# Patient Record
Sex: MALE | Race: WHITE | NOT HISPANIC OR LATINO | Employment: UNEMPLOYED | URBAN - METROPOLITAN AREA
[De-identification: names, ages, dates, MRNs, and addresses within clinical notes are randomized per-mention and may not be internally consistent; named-entity substitution may affect disease eponyms.]

---

## 2023-01-24 PROBLEM — F32.A ANXIETY AND DEPRESSION: Status: ACTIVE | Noted: 2023-01-24

## 2023-01-24 PROBLEM — F41.9 ANXIETY AND DEPRESSION: Status: ACTIVE | Noted: 2023-01-24

## 2023-01-24 PROBLEM — H93.12 TINNITUS OF LEFT EAR: Status: ACTIVE | Noted: 2023-01-24

## 2023-01-24 PROBLEM — M19.072 ARTHROPATHY OF LEFT ANKLE: Status: ACTIVE | Noted: 2023-01-24

## 2023-01-24 PROBLEM — E10.9 CONTROLLED TYPE 1 DIABETES MELLITUS (MULTI): Status: ACTIVE | Noted: 2023-01-24

## 2023-01-24 PROBLEM — R10.13 CHRONIC EPIGASTRIC PAIN: Status: ACTIVE | Noted: 2023-01-24

## 2023-01-24 PROBLEM — Z96.41 INSULIN PUMP IN PLACE: Status: ACTIVE | Noted: 2023-01-24

## 2023-01-24 PROBLEM — G89.29 CHRONIC EPIGASTRIC PAIN: Status: ACTIVE | Noted: 2023-01-24

## 2023-01-24 PROBLEM — H90.42 SENSORINEURAL HEARING LOSS (SNHL) OF LEFT EAR WITH UNRESTRICTED HEARING OF RIGHT EAR: Status: ACTIVE | Noted: 2023-01-24

## 2023-01-24 PROBLEM — R03.0 ELEVATED BLOOD PRESSURE READING: Status: ACTIVE | Noted: 2023-01-24

## 2023-01-24 RX ORDER — URINE ACETONE TEST STRIPS
STRIP MISCELLANEOUS
COMMUNITY
Start: 2014-09-08

## 2023-01-24 RX ORDER — EMTRICITABINE AND TENOFOVIR ALAFENAMIDE 200; 25 MG/1; MG/1
1 TABLET ORAL DAILY
COMMUNITY
Start: 2022-10-18 | End: 2024-02-22

## 2023-01-24 RX ORDER — GLUCAGON 3 MG/1
3 POWDER NASAL
COMMUNITY
Start: 2021-05-24 | End: 2024-02-12 | Stop reason: SDUPTHER

## 2023-01-24 RX ORDER — IBUPROFEN 200 MG
TABLET ORAL
COMMUNITY
Start: 2018-01-31

## 2023-01-24 RX ORDER — INSULIN LISPRO-AABC 100 [IU]/ML
INJECTION, SOLUTION INTRAVENOUS; SUBCUTANEOUS
COMMUNITY
Start: 2022-02-09

## 2023-03-31 ENCOUNTER — TELEMEDICINE (OUTPATIENT)
Dept: PRIMARY CARE | Facility: CLINIC | Age: 28
End: 2023-03-31
Payer: COMMERCIAL

## 2023-03-31 DIAGNOSIS — Z20.2 EXPOSURE TO GONORRHEA: Primary | ICD-10-CM

## 2023-03-31 DIAGNOSIS — Z11.3 SCREENING FOR GONORRHEA: ICD-10-CM

## 2023-03-31 DIAGNOSIS — Z11.3 ROUTINE SCREENING FOR STI (SEXUALLY TRANSMITTED INFECTION): ICD-10-CM

## 2023-03-31 DIAGNOSIS — Z11.8 SCREENING FOR CHLAMYDIAL DISEASE: ICD-10-CM

## 2023-03-31 PROCEDURE — 99212 OFFICE O/P EST SF 10 MIN: CPT | Performed by: FAMILY MEDICINE

## 2023-03-31 NOTE — PROGRESS NOTES
Subjective   Patient ID: Talib Bell is a 27 y.o. male who presents for 3 month Descovy follow up (Asymptomatic, did recently stop taking).    HPI   He is here today for his 3-month follow-up.  He had been taking Descovy daily for HIV preexposure prophylaxis, but stopped taking this medication approximately 1.5 months ago.  He is now in a relationship and does not feel that he needs to continue to take this medication for prevention.  We did speak on the phone approximately 6 weeks ago and had treated him with doxycycline due to possible chlamydia exposure.  He has not had any symptoms including urethral discharge.         Review of Systems    Objective   There were no vitals taken for this visit.    Physical Exam    Assessment/Plan   Problem List Items Addressed This Visit    None  Visit Diagnoses       Exposure to gonorrhea    -  Primary    Screening for gonorrhea        Relevant Orders    C. Trachomatis / N. Gonorrhoeae, Amplified Detection    Routine screening for STI (sexually transmitted infection)        Relevant Orders    C. Trachomatis / N. Gonorrhoeae, Amplified Detection    Screening for chlamydial disease        Relevant Orders    C. Trachomatis / N. Gonorrhoeae, Amplified Detection        HIV preexposure prophylaxis:  He is no longer taking Descovy since he is currently in a relationship and does not feel that he needs to restart at this time.  Recommended scheduling a follow-up appointment if he does decide that he would like to restart this medication.  He is interested in being rescreened for gonorrhea and chlamydia due to possible exposure last month.  We will obtain urine testing for gonorrhea and chlamydia.  Otherwise, recommended follow-up in October when due for next annual physical

## 2023-08-08 LAB — HEMOGLOBIN A1C/HEMOGLOBIN TOTAL IN BLOOD EXTERNAL: 7.7 %

## 2023-10-10 ENCOUNTER — APPOINTMENT (OUTPATIENT)
Dept: ENDOCRINOLOGY | Facility: CLINIC | Age: 28
End: 2023-10-10
Payer: COMMERCIAL

## 2023-11-30 ENCOUNTER — TELEPHONE (OUTPATIENT)
Dept: ENDOCRINOLOGY | Facility: CLINIC | Age: 28
End: 2023-11-30
Payer: COMMERCIAL

## 2023-11-30 DIAGNOSIS — E10.9 TYPE 1 DIABETES MELLITUS WITHOUT COMPLICATION (MULTI): ICD-10-CM

## 2023-12-01 ENCOUNTER — LAB (OUTPATIENT)
Dept: LAB | Facility: LAB | Age: 28
End: 2023-12-01
Payer: COMMERCIAL

## 2023-12-01 DIAGNOSIS — E10.9 TYPE 1 DIABETES MELLITUS WITHOUT COMPLICATION (MULTI): ICD-10-CM

## 2023-12-01 LAB
EST. AVERAGE GLUCOSE BLD GHB EST-MCNC: 140 MG/DL
HBA1C MFR BLD: 6.5 %

## 2023-12-01 PROCEDURE — 83036 HEMOGLOBIN GLYCOSYLATED A1C: CPT

## 2024-02-05 DIAGNOSIS — E10.9 TYPE 1 DIABETES MELLITUS WITHOUT COMPLICATION (MULTI): Primary | ICD-10-CM

## 2024-02-06 DIAGNOSIS — E10.9 TYPE 1 DIABETES MELLITUS WITHOUT COMPLICATION (MULTI): ICD-10-CM

## 2024-02-06 NOTE — TELEPHONE ENCOUNTER
Patient asked for script for Dexcom Transmitter to be sent to [facility], New script pended to provider. Shaista Mcleod LPN

## 2024-02-12 ENCOUNTER — TELEPHONE (OUTPATIENT)
Dept: ENDOCRINOLOGY | Facility: CLINIC | Age: 29
End: 2024-02-12

## 2024-02-12 ENCOUNTER — TELEMEDICINE CLINICAL SUPPORT (OUTPATIENT)
Dept: ENDOCRINOLOGY | Facility: CLINIC | Age: 29
End: 2024-02-12
Payer: COMMERCIAL

## 2024-02-12 DIAGNOSIS — E55.9 VITAMIN D DEFICIENCY: ICD-10-CM

## 2024-02-12 DIAGNOSIS — E10.9 TYPE 1 DIABETES MELLITUS WITHOUT COMPLICATION (MULTI): Primary | ICD-10-CM

## 2024-02-12 DIAGNOSIS — E10.9 CONTROLLED DIABETES MELLITUS TYPE 1 WITHOUT COMPLICATIONS (MULTI): Primary | ICD-10-CM

## 2024-02-12 PROCEDURE — G0108 DIAB MANAGE TRN  PER INDIV: HCPCS | Performed by: STUDENT IN AN ORGANIZED HEALTH CARE EDUCATION/TRAINING PROGRAM

## 2024-02-12 RX ORDER — BLOOD-GLUCOSE SENSOR
EACH MISCELLANEOUS
Qty: 9 EACH | Refills: 3 | Status: SHIPPED | OUTPATIENT
Start: 2024-02-12

## 2024-02-12 RX ORDER — GLUCAGON 3 MG/1
1 POWDER NASAL AS NEEDED
Qty: 2 EACH | Refills: 3 | Status: SHIPPED | OUTPATIENT
Start: 2024-02-12

## 2024-02-12 NOTE — TELEPHONE ENCOUNTER
Additional information concerning Dexcom transmitter for Talib Bell   Faxed to [facility] at [phone] as requested . Shaista Mcleod LPN

## 2024-02-12 NOTE — PROGRESS NOTES
Reason for Visit:  Talib Bell is a 28 y.o. male who presents for Follow-up DSME Visit    DSME - Global Assessment    Referring Provider: Dr. Caputo  Marital Status: single.  Support Person:  na .    Have you had diabetes education in the past?  Yes. When: 4 months ago .  What Concerns you most about having diabetes: Managing glycemia and currently would like to add more carbs to intake, but also loose a few pounds.    Readiness to Learn: demonstrates willingness to learn and demonstrates ability to learn  Preferred learning method: observing, reading and writing, listening, and doing    Household Composition: living independently, alone    Demographics:   Difficulties with: None  Highest Level of Education: College Graduate  Race/Ethnic Origin: White/  Occupation:  Researcher medical devices  Work hours: full-time day.    Health Status:  Smoking/Tobacco Use: No, patient does not smoke or use tobacco.  Alcohol Use: Yes, patient drinks alcohol. Pt historically socially drinks, but has stopped since the new year.     Type of Diabetes: Type 1    Patient Active Problem List    Diagnosis Date Noted    Anxiety and depression 01/24/2023    Arthropathy of left ankle 01/24/2023    Chronic epigastric pain 01/24/2023    Controlled type 1 diabetes mellitus (CMS/HCC) 01/24/2023    Elevated blood pressure reading 01/24/2023    Insulin pump in place 01/24/2023    Sensorineural hearing loss (SNHL) of left ear with unrestricted hearing of right ear 01/24/2023    Tinnitus of left ear 01/24/2023         Lab Results   Component Value Date    HGBA1C 6.5 (H) 12/01/2023    HGBA1C 7.7 03/15/2023    HGBA1C 7.4 05/28/2020       Diabetes Self-Management Skills and Behaviors:   Do you exercise regularly?: Yes. 3-4 times/week.  Physical Activity : walking, running, weight training, and calisthenics  Yes  How do you manage your diabetes when you are sick?: call doctor, drink more fluids, and test blood sugar more often      Current  Outpatient Medications   Medication Sig Dispense Refill    acetone, urine, test (Ketostix) strip USE AS DIRECTED.      blood-glucose transmitter device device Inject 1 each under the skin continuously. Use as instructed DEXCOM G6 90 each 3    emtricitabine-tenofovir alafen (Descovy) 200-25 mg tablet Take 1 tablet by mouth once daily.      glucagon (Baqsimi) 3 mg/actuation spray,non-aerosol Administer 3 mg into affected nostril(s).      glucagon 1 mg injection 1 mg.      glucose 4 gram chewable tablet Chew.      insulin lispro-aabc (Lyumjev U-100 Insulin) 100 unit/mL solution Use in pump as directed.  units.       No current facility-administered medications for this visit.     T:slim x2 with control iQ. Reviewed option to program sleep mode as an option to help maintain glycemia. Pt was having some lower glucose readings early morning from autocorrections and appears he would benefit from reducing his 12am-5am ISF from 25 to 30.       Injection/Infusion Sites: arm(s) and buttock(s). Appropriate disposal of sharps. Appropriate storage of insulin.    Monitorng: CGM: Dexcom G6 synced with pump          Acute Complications-Safety: carries glucose    Hypoglycemia: Frequency: few times a week.  Hypoglycemia Treatment: auto titration with control iq, simple sugars- rule 15.    Hyperglycemia: polyuria and tiredness  Hyperglycemia Treatment: water, activity, insulin correction, check pump set.    DSME - Meal Planning and Diet Recall  Are you currently following any meal plan: Carbohydrate Counting, Low Carbohydrates, Low Fat, and lean protein  .    Pt would like to lose weight, but also wants to add more carb intake to possible help with energy throughout the day. Pt had hx of vit. D deficiency and will pend labs for consideration (including TSH). Pended dietitian consult for weight management and reviewed balance of marcro nutrients     How many meals do you eat in per day: three.  Which meals do you tend to skip:  none  What do you drink with and between meals: water    Topics Covered and Impression:    DSME Topics Covered During Visit:   Glucagon Teaching  Reviewed Hyperglycemia Signs/Symptoms/Tx Plan  Glycemic Pattern Management  Healthy Meal Plan  Reviewed adding some carb intake ~ 80g total per day- follow up with dietitian consult. Pt also encouraged to upgrade tandem pump software and work towards switching to G7 sensor. Pended orders to provider along with glucagon reorder.      Time: I personally spent a total of 60 minutes with the patient providing diabetes self-management education. Visit documentation will be sent electronically to referring provider.       Chin Davis, RONN, RN, [facility]

## 2024-02-16 ENCOUNTER — TELEPHONE (OUTPATIENT)
Dept: ENDOCRINOLOGY | Facility: CLINIC | Age: 29
End: 2024-02-16
Payer: COMMERCIAL

## 2024-02-20 ENCOUNTER — TELEPHONE (OUTPATIENT)
Dept: PRIMARY CARE | Facility: CLINIC | Age: 29
End: 2024-02-20
Payer: COMMERCIAL

## 2024-02-22 ENCOUNTER — LAB (OUTPATIENT)
Dept: LAB | Facility: LAB | Age: 29
End: 2024-02-22
Payer: COMMERCIAL

## 2024-02-22 ENCOUNTER — OFFICE VISIT (OUTPATIENT)
Dept: PRIMARY CARE | Facility: CLINIC | Age: 29
End: 2024-02-22
Payer: COMMERCIAL

## 2024-02-22 VITALS
HEART RATE: 71 BPM | RESPIRATION RATE: 16 BRPM | OXYGEN SATURATION: 98 % | SYSTOLIC BLOOD PRESSURE: 124 MMHG | DIASTOLIC BLOOD PRESSURE: 76 MMHG

## 2024-02-22 DIAGNOSIS — B35.4 TINEA CORPORIS: Primary | ICD-10-CM

## 2024-02-22 DIAGNOSIS — E55.9 VITAMIN D DEFICIENCY: ICD-10-CM

## 2024-02-22 DIAGNOSIS — E10.9 TYPE 1 DIABETES MELLITUS WITHOUT COMPLICATION (MULTI): ICD-10-CM

## 2024-02-22 PROBLEM — R10.13 CHRONIC EPIGASTRIC PAIN: Status: RESOLVED | Noted: 2023-01-24 | Resolved: 2024-02-22

## 2024-02-22 PROBLEM — G89.29 CHRONIC EPIGASTRIC PAIN: Status: RESOLVED | Noted: 2023-01-24 | Resolved: 2024-02-22

## 2024-02-22 PROBLEM — R03.0 ELEVATED BLOOD PRESSURE READING: Status: RESOLVED | Noted: 2023-01-24 | Resolved: 2024-02-22

## 2024-02-22 PROBLEM — M19.072 ARTHROPATHY OF LEFT ANKLE: Status: RESOLVED | Noted: 2023-01-24 | Resolved: 2024-02-22

## 2024-02-22 LAB
25(OH)D3 SERPL-MCNC: 26 NG/ML (ref 30–100)
TSH SERPL-ACNC: 2.22 MIU/L (ref 0.44–3.98)

## 2024-02-22 PROCEDURE — 84443 ASSAY THYROID STIM HORMONE: CPT

## 2024-02-22 PROCEDURE — 99213 OFFICE O/P EST LOW 20 MIN: CPT | Performed by: FAMILY MEDICINE

## 2024-02-22 PROCEDURE — 3078F DIAST BP <80 MM HG: CPT | Performed by: FAMILY MEDICINE

## 2024-02-22 PROCEDURE — 3074F SYST BP LT 130 MM HG: CPT | Performed by: FAMILY MEDICINE

## 2024-02-22 PROCEDURE — 1036F TOBACCO NON-USER: CPT | Performed by: FAMILY MEDICINE

## 2024-02-22 PROCEDURE — 36415 COLL VENOUS BLD VENIPUNCTURE: CPT

## 2024-02-22 PROCEDURE — 82306 VITAMIN D 25 HYDROXY: CPT

## 2024-02-22 RX ORDER — KETOCONAZOLE 20 MG/G
CREAM TOPICAL 2 TIMES DAILY
Qty: 30 G | Refills: 0 | Status: SHIPPED | OUTPATIENT
Start: 2024-02-22 | End: 2024-03-07

## 2024-02-22 RX ORDER — BUPROPION HYDROCHLORIDE 300 MG/1
300 TABLET ORAL DAILY
COMMUNITY

## 2024-02-22 NOTE — PROGRESS NOTES
Subjective   Patient ID: Talib Bell is a 28 y.o. male who presents for Rash.    Rash  This is a new problem. The current episode started 1 to 4 weeks ago. Location: Left Popliteal.   Here today to discuss rash.  He has had a rash on his left posterior knee for approximately 1 month.  Nothing to treat.  No recent exposure to any new potential allergens.  No pain or itch        Review of Systems   Skin:  Positive for rash.       Objective   /76   Pulse 71   Resp 16   SpO2 98%     Physical Exam  Constitutional:       General: He is not in acute distress.     Appearance: Normal appearance. He is well-developed.   Pulmonary:      Effort: Pulmonary effort is normal.   Skin:     Findings: Rash present.      Comments: There is a slightly scaly, oval-shaped, dull erythematous patch located left posterior knee and popliteal space, measuring 3 to 4 cm in diameter   Neurological:      Mental Status: He is alert.   Psychiatric:         Mood and Affect: Mood normal.         Behavior: Behavior normal.         Assessment/Plan   Problem List Items Addressed This Visit    None  Visit Diagnoses       Tinea corporis    -  Primary    Relevant Medications    ketoconazole (NIZOral) 2 % cream        Rash is most consistent with tinea corporis.  We will treat with ketoconazole 2% cream twice daily for up to 14 days.  Recommended that he call or follow-up in 2 weeks if not resolved

## 2024-03-07 ENCOUNTER — TELEMEDICINE CLINICAL SUPPORT (OUTPATIENT)
Dept: NUTRITION | Facility: CLINIC | Age: 29
End: 2024-03-07
Payer: COMMERCIAL

## 2024-03-07 VITALS — HEIGHT: 72 IN | BODY MASS INDEX: 30.11 KG/M2

## 2024-03-07 DIAGNOSIS — Z71.3 DIETARY COUNSELING AND SURVEILLANCE: Primary | ICD-10-CM

## 2024-03-07 DIAGNOSIS — E10.9 TYPE 1 DIABETES MELLITUS WITHOUT COMPLICATION (MULTI): ICD-10-CM

## 2024-03-07 PROCEDURE — 97802 MEDICAL NUTRITION INDIV IN: CPT | Mod: 95

## 2024-03-07 PROCEDURE — 97802 MEDICAL NUTRITION INDIV IN: CPT

## 2024-03-07 NOTE — PROGRESS NOTES
Nutrition: Initial Assessment    Reason for Nutrition Visit: Patient is a 28 y.o. male referred for T1DM. Referred on 2/12/24 by Dr. Tiara Caputo.     Nutrition Assessment    Food and Nutrient History: Pt presents virtually for nutrition counseling. Follows a high protein diet with very minimally processed foods. Goes to gym around 2-3pm. Uses [employer] and feels like he may not be getting enough kcals.    Dietary Patterns:  Based on [employer] data, currently eating 2000 kcals at most  Averaging about 150 g cho   Protein anywhere b/w 110-150 g     Dietary Recall:  Meal 1: usually skips  Meal 2: 10:30-11:30 AM  Meal 3: 5-6:30 PM  Snacks: snack in the afternoon // protein shake ([employer]) 2-3x per week after gym    Energy Intake: Good > 75 %  Food Allergy: none  Food Intolerance: none  Cooking: Patient  Grocery Shopping: Patient  Dietary Supplements: none  Food Insecurity: Denies    Physical Activity:   Goes to gym 1-1.5 hr, 4 days per week, weight lifting  Walking on treadmill -- gets 10,000 steps daily     Labs:  Lab Results   Component Value Date    HGBA1C 6.5 (H) 12/01/2023    HGBA1C 7.7 03/15/2023    HGBA1C 7.4 05/28/2020     08/29/2022    K 4.1 08/29/2022     08/29/2022    CO2 30 08/29/2022    BUN 10 08/29/2022    CREATININE 0.90 08/29/2022    CALCIUM 9.1 08/29/2022    ALBUMIN 4.2 08/29/2022    PROT 6.9 08/29/2022    BILITOT 0.6 08/29/2022    ALKPHOS 77 08/29/2022    ALT 18 08/29/2022    AST 23 08/29/2022    GLUCOSE 183 (H) 08/29/2022    CHOL 148 05/24/2021    TRIG 77 05/24/2021    HDL 60.0 05/24/2021   Comments: Low vitamin D = 26 (2/22/24). A1c at goal (12/1/23). Last lipid panel = normal (5/24/21).       Diabetes:  Diagnosed at 9 or 10 y.o.   Prior Nutrition Education: Yes  Current DM Medications/Insulin Regimen: Tandem t slim     -----------------------------  Dexcom Clarity  -----------------------------  Talib Bell  YOB: 1995  Generated at: Thu, Mar 7, 2024 11:53 AM  EST  Reporting period: Fri Feb 23, 2024 - Thu Mar 7, 2024  -----------------------------  Glucose Details  Average glucose: 155 mg/dL  Standard deviation: 52 mg/dL  GMI: 7.0%  -----------------------------  Time in Range  Very High: 5%  High: 19%  In Range: 75%  Low: <1%  Very Low: 0%    Target Range   mg/dL    -----------------------------  CGM Details  Sensor usage: 93%  Days with CGM data: 13/14      Nutrition Focused Physical Exam:  Performed/Deferred: Deferred due to be being virtual visit      Past Medical History:  Patient Active Problem List   Diagnosis    Anxiety and depression    Controlled type 1 diabetes mellitus (CMS/HCC)    Insulin pump in place    Sensorineural hearing loss (SNHL) of left ear with unrestricted hearing of right ear    Tinnitus of left ear        Anthropometrics:  Ht Readings from Last 1 Encounters:   03/07/24 1.829 m (6')     BMI Readings from Last 1 Encounters:   03/07/24 30.11 kg/m²     Wt Readings from Last 10 Encounters:   03/15/23 101 kg (222 lb)   10/18/22 101 kg (223 lb)   08/24/22 96.2 kg (212 lb)   02/02/22 103 kg (227 lb)   10/15/21 103 kg (227 lb)   07/12/21 104 kg (230 lb)   06/09/21 104 kg (230 lb)   05/24/21 107 kg (236 lb 8.9 oz)   10/14/20 99.3 kg (219 lb)   09/14/20 102 kg (224 lb)     Current wt = 225 lbs. (3/7/24)       Estimated Energy Needs:    Total Energy Estimated Needs (kCal): 2350 kCal   Method for Estimating Needs: MSJ 2039 x 1.4 - 500 (for weight loss)   Total Protein Estimated Needs (g): 130 g   Total Protein Estimated Needs (g/kg): 1.3 g/kg    Nutrition Diagnosis     Patient has Nutrition Diagnosis: Yes Diagnosis Status (1): New  Nutrition Diagnosis 1: Inadequate vitamin intake Related to (1): low dietary intake vitamin D As Evidenced by (1): vitamin D = 26 (2/22/24)     Diagnosis Status (2): New Nutrition Diagnosis 2: Food and nutrition related knowledge deficit  Nutrition Diagnosis 2: Food and nutrition related knowledge deficit Related to (2):  limited prior exposure to nutrition education for a balanced diet As Evidenced by (2): patient's desire to learn                    Nutrition Interventions/Recommendations   Individualized Nutrition Prescription Provided for : 2000 kcals, 130 grams protein, 180 grams CHO per day   Meals & Snacks: Other, specify:  Goals: Carb counting    Nutrition Education  Discussed caloric intake and macronutrient breakdowns; provided suggestions and made adjustments to current dietary patterns based on patient's fitness goals. Breakdown works out to 26% protein, 36% cho, 38% fat. Patient will track in [employer]. We can adjust from there.   Answered patient's general nutrition questions.     *Patient expressed understanding of the nutrition education and denied any additional questions/concerns.       Nutrition Monitoring and Evaluation   Intentional wt loss of 0.5-1 lb per week trending toward patient's goal weight of 210-215 lbs.   Vitamin D WNL     Readiness to Change : Excellent  Level of Understanding : Excellent  Anticipated Compliant : Excellent     Follow-up: 4-6 weeks

## 2024-03-20 ENCOUNTER — CLINICAL SUPPORT (OUTPATIENT)
Dept: AUDIOLOGY | Facility: CLINIC | Age: 29
End: 2024-03-20
Payer: COMMERCIAL

## 2024-03-20 ENCOUNTER — OFFICE VISIT (OUTPATIENT)
Dept: OTOLARYNGOLOGY | Facility: CLINIC | Age: 29
End: 2024-03-20
Payer: COMMERCIAL

## 2024-03-20 DIAGNOSIS — H93.12 TINNITUS OF LEFT EAR: ICD-10-CM

## 2024-03-20 DIAGNOSIS — H90.42 SENSORINEURAL HEARING LOSS (SNHL) OF LEFT EAR WITH UNRESTRICTED HEARING OF RIGHT EAR: Primary | ICD-10-CM

## 2024-03-20 PROCEDURE — 1036F TOBACCO NON-USER: CPT | Performed by: OTOLARYNGOLOGY

## 2024-03-20 PROCEDURE — 92567 TYMPANOMETRY: CPT | Performed by: AUDIOLOGIST

## 2024-03-20 PROCEDURE — 92557 COMPREHENSIVE HEARING TEST: CPT | Performed by: AUDIOLOGIST

## 2024-03-20 PROCEDURE — 99213 OFFICE O/P EST LOW 20 MIN: CPT | Performed by: OTOLARYNGOLOGY

## 2024-03-20 NOTE — PROGRESS NOTES
Talib, age 28, was seen today for his annual hearing evaluation during his follow up with ENT, Dr. Levin.  He has a history of left hearing loss with tinnitus.  He denies concern for hearing change but does note that his tinnitus seems to be less noticeable.      Results:  Otoscopy revealed clear ear canals and tympanic membranes were visualized bilaterally.  Tympanometry revealed normal, Type A tympanograms, indicating normal ear canal volume, peak pressure and compliance bilaterally.   Audiometric thresholds revealed normal hearing sensitivity in his right ear and a normal hearing sensitivity [id number] Hz sloping to a severe sensorineural hearing loss in his left ear.  Word recognition scores were excellent bilaterally.  Hearing levels have remained stable as compared to his last evaluation March 2023.    Recommendations:  Follow-up with PCP, Dr. Dunn, as medically directed.  Follow-up with ENT, Dr. Levin, as medically directed.  Consider amplification for left ear.  Retest hearing annually to monitor.

## 2024-03-20 NOTE — PROGRESS NOTES
Talib Bell is a 28 y.o. year old male patient with YEARLY FU ON HEARING     Patient office today for 1 year follow-up on left ear hearing loss.  Patient with history of left ear hearing loss in the high frequencies with associated tinnitus.  Patient denies any hearing changes but reports that tinnitus may have lessened as it does not appear to be as noticeable.  All other ENT issues are negative.            Physical Exam:   General appearance: No acute distress. Normal facies. Symmetric facial movement. No gross lesions of the face are noted.  The external ear structures appear normal. The ear canals patent and the tympanic membranes are intact without evidence of air-fluid levels, retraction, or congenital defects.  Anterior rhinoscopy notes essentially a midline nasal septum. Examination is noted for normal healthy mucosal membranes without any evidence of lesions, polyps, or exudate. The tongue is normally mobile. There are no lesions on the gingiva, buccal, or oral mucosa. There are no oral cavity masses.  The neck is negative for mass lymphadenopathy. The trachea and parotid are clear. The thyroid bed is grossly unremarkable. The salivary gland structures are grossly unremarkable.  Audiogram:  Audiogram demonstrates essentially normal hearing right ear with no hearing loss of the right ear with a mild to severe high-frequency loss.  Assessment/Plan     1.  Sensorineural hearing loss left ear  Patient seen in the office today for follow-up on ears.  Patient stable hearing when compared to March of last year.  I favor observation and recommend follow-up in 1 year.    All questions were answered in this regard accordingly.

## 2024-04-11 ENCOUNTER — OFFICE VISIT (OUTPATIENT)
Dept: PRIMARY CARE | Facility: CLINIC | Age: 29
End: 2024-04-11
Payer: COMMERCIAL

## 2024-04-11 VITALS
SYSTOLIC BLOOD PRESSURE: 122 MMHG | HEART RATE: 98 BPM | OXYGEN SATURATION: 97 % | WEIGHT: 221 LBS | BODY MASS INDEX: 29.97 KG/M2 | DIASTOLIC BLOOD PRESSURE: 78 MMHG

## 2024-04-11 DIAGNOSIS — L30.9 DERMATITIS: Primary | ICD-10-CM

## 2024-04-11 PROCEDURE — 99213 OFFICE O/P EST LOW 20 MIN: CPT | Performed by: FAMILY MEDICINE

## 2024-04-11 PROCEDURE — 3074F SYST BP LT 130 MM HG: CPT | Performed by: FAMILY MEDICINE

## 2024-04-11 PROCEDURE — 1036F TOBACCO NON-USER: CPT | Performed by: FAMILY MEDICINE

## 2024-04-11 PROCEDURE — 3078F DIAST BP <80 MM HG: CPT | Performed by: FAMILY MEDICINE

## 2024-04-11 RX ORDER — TRIAMCINOLONE ACETONIDE 1 MG/G
1 CREAM TOPICAL 2 TIMES DAILY PRN
Qty: 30 G | Refills: 0 | Status: SHIPPED | OUTPATIENT
Start: 2024-04-11 | End: 2024-04-25

## 2024-04-11 NOTE — PROGRESS NOTES
Subjective   Patient ID: Talib Bell is a 28 y.o. male who presents for Rash.    Rash  This is a recurrent problem. The problem has been gradually worsening since onset. The affected locations include the neck (back of legs). The rash is characterized by scaling and dryness. Treatments tried: rx ketoconazole cream.   He is here today to follow-up on rash  He was seen 6 weeks ago with an annular rash involving his left posterior knee which had been present for 1 month.  At that time it was felt that this was due to tinea corporis.  We treated him with ketoconazole cream  He applied the cream and did not notice any improvement.  He does mention that the box the cream came in was damaged and he is not sure if this had affected the efficacy of the cream.  The rash seems to have gotten slightly bigger, and now 1 week ago he noticed a similar, smaller rash on his right neck.  The rash is dry.  No itch or pain.  No history of similar symptoms in past      Review of Systems   Skin:  Positive for rash.       Objective   /78   Pulse 98   Wt 100 kg (221 lb)   SpO2 97%   BMI 29.97 kg/m²     Physical Exam  Constitutional:       General: He is not in acute distress.     Appearance: Normal appearance. He is well-developed.   Pulmonary:      Effort: Pulmonary effort is normal.   Skin:     Findings: Rash present.      Comments: There is a dull erythematous, dry, scaly annular plaque located posterior aspect of left knee measuring approximately 5 cm in diameter.  There is a similar, smaller lesion located anterior-lateral aspect of right lower neck measuring approximately 1.5 cm in diameter   Neurological:      Mental Status: He is alert.   Psychiatric:         Mood and Affect: Mood normal.         Behavior: Behavior normal.         Assessment/Plan   Problem List Items Addressed This Visit    None  Visit Diagnoses       Dermatitis    -  Primary    Relevant Medications    triamcinolone (Kenalog) 0.1 % cream        The rash  still appears consistent with tinea corporis, however this did not improve with ketoconazole cream.  Differential diagnosis would also include possible eczema.  We will treat with topical triamcinolone cream for up to 2 weeks.  We discussed that he could also apply over-the-counter topical clotrimazole cream in case this is still a fungal infection.  If rash has not resolved in the next 2 to 4 weeks recommended that he call and we will plan on referring to dermatology at that time

## 2024-04-16 ENCOUNTER — APPOINTMENT (OUTPATIENT)
Dept: PRIMARY CARE | Facility: CLINIC | Age: 29
End: 2024-04-16
Payer: COMMERCIAL

## 2024-04-18 ENCOUNTER — TELEPHONE (OUTPATIENT)
Dept: ENDOCRINOLOGY | Facility: CLINIC | Age: 29
End: 2024-04-18
Payer: COMMERCIAL

## 2024-04-18 ENCOUNTER — APPOINTMENT (OUTPATIENT)
Dept: NUTRITION | Facility: CLINIC | Age: 29
End: 2024-04-18
Payer: COMMERCIAL

## 2024-04-18 NOTE — TELEPHONE ENCOUNTER
[facility] for pump supplies faxed on behalf of Talib Thornton at [phone] as requested. Shaista Mcleod LPN

## 2024-05-14 ENCOUNTER — OFFICE VISIT (OUTPATIENT)
Dept: ENDOCRINOLOGY | Facility: CLINIC | Age: 29
End: 2024-05-14
Payer: COMMERCIAL

## 2024-05-14 VITALS
BODY MASS INDEX: 30.22 KG/M2 | SYSTOLIC BLOOD PRESSURE: 127 MMHG | HEART RATE: 78 BPM | RESPIRATION RATE: 20 BRPM | TEMPERATURE: 98.7 F | DIASTOLIC BLOOD PRESSURE: 79 MMHG | HEIGHT: 73 IN | WEIGHT: 228 LBS

## 2024-05-14 DIAGNOSIS — E55.9 VITAMIN D DEFICIENCY: Primary | ICD-10-CM

## 2024-05-14 DIAGNOSIS — E10.9 TYPE 1 DIABETES MELLITUS WITHOUT COMPLICATION (MULTI): ICD-10-CM

## 2024-05-14 LAB — POC HEMOGLOBIN A1C: 6.9 % (ref 4.2–6.5)

## 2024-05-14 PROCEDURE — 83036 HEMOGLOBIN GLYCOSYLATED A1C: CPT | Performed by: INTERNAL MEDICINE

## 2024-05-14 PROCEDURE — 99214 OFFICE O/P EST MOD 30 MIN: CPT | Performed by: INTERNAL MEDICINE

## 2024-05-14 PROCEDURE — 3074F SYST BP LT 130 MM HG: CPT | Performed by: INTERNAL MEDICINE

## 2024-05-14 PROCEDURE — 1036F TOBACCO NON-USER: CPT | Performed by: INTERNAL MEDICINE

## 2024-05-14 PROCEDURE — 3078F DIAST BP <80 MM HG: CPT | Performed by: INTERNAL MEDICINE

## 2024-05-14 PROCEDURE — 95251 CONT GLUC MNTR ANALYSIS I&R: CPT | Performed by: INTERNAL MEDICINE

## 2024-05-14 NOTE — PROGRESS NOTES
Consults    Reason For Consult  Type 1 diabetes     History Of Present Illness  Talib Bell is a 28 y.o. male presenting with type 1 diabetes mellitus .        -Diagnosed with type 1 diabetes in 2006  -Has been on insulin pump for last 10 years. Initially T flex followed by T slim      He changed his diet since 01/2023; eating 200g of protein per day. States his glycemic control has been worse since his diet has changed.  Carb sources are more balanced     Current regimen:   -Insulin: Lyumjev   -CGM: Dexcom 6   -Insulin pump type: T-Slim/Tandem .   -Target:  mg/dL         -Hypoglycemia awareness: yes . Denies severe episodes of hypoglycemia  -Hx. 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 Solved, need to look at History of Diabetes Problem List. Includes resolved entries. Clinician can look above and enter the count.      History where DKA was Reason for Admission in last year no        Insulin administered via pump      NOTE: Anything under this line is for testing purposes only       Any family history of thyroid cancer? no  Any personal history of radiation to your  head/neck? no    Past Medical History  He has a past medical history of Other conditions influencing health status (08/24/2022).    Surgical History  He has a past surgical history that includes Other surgical history (06/09/2021).     Social History  He reports that he has never smoked. He has never used smokeless tobacco. He reports that he does not currently use alcohol. He reports that he does not currently use drugs after having used the following drugs: Marijuana. Frequency: 3.00 times per week.    Family History  No family history on file.     Allergies  Codeine    Review of Systems    Past Medical History:   Diagnosis Date    Other conditions influencing health status 08/24/2022    Diabetes mellitus type 1, uncontrolled       Past Surgical History:   Procedure Laterality Date    OTHER SURGICAL HISTORY  06/09/2021    Foot surgery       Social History     Socioeconomic History    Marital status: Single     Spouse name: Not on file    Number of children: Not on file    Years of education: Not on file    Highest education level: Not on file   Occupational History    Not on file   Tobacco Use    Smoking status: Never    Smokeless tobacco: Never   Substance and Sexual Activity    Alcohol use: Not Currently    Drug use: Not Currently     Frequency: 3.0 times per week     Types: Marijuana    Sexual activity: Yes     Partners: Male   Other Topics Concern    Not on file   Social History Narrative    Not on file     Social Determinants of Health     Financial Resource Strain: Not on file   Food Insecurity: Not on file   Transportation Needs: Not on file   Physical Activity: Not on file   Stress: Not on file   Social Connections: Not on file   Intimate Partner Violence: Not on file   Housing Stability: Not on file        Physical Exam     ROS, PMH, FH/SH, surgical history and allergies have been reviewed.    Last Recorded Vitals  Blood pressure 127/79, pulse 78, temperature 37.1 °C (98.7 °F), temperature source  "Tympanic, resp. rate 20, height 1.854 m (6' 1\"), weight 103 kg (228 lb).    Relevant Results         If you would like to pull in Medications, type .meds     If you would like to pull in Lab results for the last 24 hours, type .[id number]    If you would like to pull in specific Lab results, type .ll     If you would like to pull in Imaging results, type .imgrslt :99}  Lab Review  Lab Results   Component Value Date    BILITOT 0.6 08/29/2022    CALCIUM 9.1 08/29/2022    CO2 30 08/29/2022     08/29/2022    CREATININE 0.90 08/29/2022    GLUCOSE 183 (H) 08/29/2022    ALKPHOS 77 08/29/2022    K 4.1 08/29/2022    PROT 6.9 08/29/2022     08/29/2022    AST 23 08/29/2022    ALT 18 08/29/2022    BUN 10 08/29/2022    ANIONGAP 10 08/29/2022    ALBUMIN 4.2 08/29/2022    GFRMALE >90 08/29/2022     Lab Results   Component Value Date    TRIG 77 05/24/2021    CHOL 148 05/24/2021    HDL 60.0 05/24/2021     Lab Results   Component Value Date    HGBA1C 6.9 (A) 05/14/2024    HGBA1C 6.5 (H) 12/01/2023    HGBA1C 7.7 03/15/2023     The ASCVD Risk score (Olalla DK, et al., 2019) failed to calculate for the following reasons:    The 2019 ASCVD risk score is only valid for ages 40 to 79       Assessment/Plan   Problem List Items Addressed This Visit    None  Visit Diagnoses         Codes    Vitamin D deficiency    -  Primary E55.9    Relevant Orders    Lipid Panel    Albumin , Urine Random    Comprehensive Metabolic Panel    Vitamin B12    Vitamin D 25-Hydroxy,Total (for eval of Vitamin D levels)    Type 1 diabetes mellitus without complication (Multi)     E10.9    Relevant Orders    POCT glycosylated hemoglobin (Hb A1C) manually resulted (Completed)    Lipid Panel    Albumin , Urine Random    Comprehensive Metabolic Panel    Vitamin B12    Vitamin D 25-Hydroxy,Total (for eval of Vitamin D levels)                 Talib is a 28 y/o M old male here for follow-up evaluation of T1DM, currently managed with Tandem T-slim Insulin pump . " No known complications.     -IO A1C: 6.9%), reflecting worsening glucose control.   --His highest glucose spikes occur in the late evening hours around 6pm-11pm.  -He says he turns his control IQ off when exercising which is usually right before dinner.   -Patient has recently adjusted his diet and is now eating 200g of protein daily.         1.Controlled T1DM  2. Insulin pump and CGM interpretation      Plan:  -Continue with current pump settings  -Patient is eating a very high protein diet and appears to have sensitivity to protein. Advised to incorporate more fat in his meals. Suggested to use olive oil, nuts etc.  --Obtain annual lipid panel   -Obtain microalbumin /creatine ratio and CMP  - Repeat HbA1C in 3 months  -Continue with low carb diet and cyvaime563 min exercise weekly        Ophthalmology  2/2024   Podiatry DR Latisha Caputo MD      I spent a total of 30+ minutes on the date of the service which included preparing to see the patient, face-to-face patient care, completing clinical documentation, obtaining and / or reviewing separately obtained history, counseling and educating the patient/family/caregiver, ordering medications, tests, or procedures, communicating with other healthcare providers (not separately reported), independently interpreting results, not separately reported, and communicating results to the patient/family/caregiver,   Name and date of birth verified.

## 2024-05-29 ENCOUNTER — TELEMEDICINE CLINICAL SUPPORT (OUTPATIENT)
Dept: ENDOCRINOLOGY | Facility: CLINIC | Age: 29
End: 2024-05-29
Payer: COMMERCIAL

## 2024-05-29 DIAGNOSIS — E10.9 TYPE 1 DIABETES MELLITUS WITHOUT COMPLICATION (MULTI): ICD-10-CM

## 2024-07-01 NOTE — PROGRESS NOTES
Reason for Visit:  Talib Bell is a 28 y.o. male who presents for Follow-up DSME Visit      Patient Active Problem List    Diagnosis Date Noted    Anxiety and depression 01/24/2023    Controlled type 1 diabetes mellitus (Multi) 01/24/2023    Insulin pump in place 01/24/2023    Sensorineural hearing loss (SNHL) of left ear with unrestricted hearing of right ear 01/24/2023    Tinnitus of left ear 01/24/2023     Current Outpatient Medications on File Prior to Visit   Medication Sig Dispense Refill    acetone, urine, test (Ketostix) strip USE AS DIRECTED.      Baqsimi 3 mg/actuation spray,non-aerosol Administer 1 spray into affected nostril(s) if needed (Give 1 spray as needed for severe low blood glucose.). 2 each 3    blood-glucose transmitter device device Inject 1 each under the skin continuously. Use as instructed DEXCOM G6 90 each 3    buPROPion XL (Wellbutrin XL) 300 mg 24 hr tablet Take 1 tablet (300 mg) by mouth once daily.      Dexcom G7 Sensor device Change sensor every 10 days as indicated. 9 each 3    glucagon 1 mg injection 1 mg.      glucose 4 gram chewable tablet Chew.      insulin lispro-aabc (Lyumjev U-100 Insulin) 100 unit/mL solution Use in pump as directed.  units.       No current facility-administered medications on file prior to visit.       Counseling and Education:     DSME - Meal Planning and Diet Recall  Are you currently following any meal plan: Carbohydrate Counting.    Comments: Pt still correcting for meals based on personal calculations vs using tandem bolus calculator. Pt has found better systems for coverage for etoh intake and exercise management.      Impression:  DSME Topics Covered During Visit:   Understanding Diabetes Basics  Taking Medications as Prescribed  Reviewed Hypoglycemia Signs/Symptoms/Tx Plan  Reviewed Hyperglycemia Signs/Symptoms/Tx Plan      Time: I personally spent a total of 25 minutes with the patient providing diabetes self-management education. Visit  documentation will be sent electronically to referring provider.     Chin Davis, RONN, RN, [facility]

## 2024-11-14 ENCOUNTER — TELEPHONE (OUTPATIENT)
Dept: ENDOCRINOLOGY | Facility: CLINIC | Age: 29
End: 2024-11-14

## 2024-11-14 ENCOUNTER — APPOINTMENT (OUTPATIENT)
Dept: ENDOCRINOLOGY | Facility: CLINIC | Age: 29
End: 2024-11-14
Payer: COMMERCIAL

## 2024-11-14 VITALS
BODY MASS INDEX: 31.14 KG/M2 | HEART RATE: 84 BPM | WEIGHT: 235 LBS | SYSTOLIC BLOOD PRESSURE: 125 MMHG | TEMPERATURE: 97.2 F | HEIGHT: 73 IN | DIASTOLIC BLOOD PRESSURE: 78 MMHG

## 2024-11-14 DIAGNOSIS — Z79.4 TYPE 2 DIABETES MELLITUS WITH HYPERGLYCEMIA, WITH LONG-TERM CURRENT USE OF INSULIN: Primary | ICD-10-CM

## 2024-11-14 DIAGNOSIS — E11.65 TYPE 2 DIABETES MELLITUS WITH HYPERGLYCEMIA, WITH LONG-TERM CURRENT USE OF INSULIN: Primary | ICD-10-CM

## 2024-11-14 DIAGNOSIS — E10.65 TYPE 1 DIABETES MELLITUS WITH HYPERGLYCEMIA (MULTI): ICD-10-CM

## 2024-11-14 LAB — POC HEMOGLOBIN A1C: 6.5 % (ref 4.2–6.5)

## 2024-11-14 PROCEDURE — 99215 OFFICE O/P EST HI 40 MIN: CPT | Performed by: NURSE PRACTITIONER

## 2024-11-14 PROCEDURE — 95251 CONT GLUC MNTR ANALYSIS I&R: CPT | Performed by: NURSE PRACTITIONER

## 2024-11-14 PROCEDURE — 3078F DIAST BP <80 MM HG: CPT | Performed by: NURSE PRACTITIONER

## 2024-11-14 PROCEDURE — 83036 HEMOGLOBIN GLYCOSYLATED A1C: CPT | Performed by: NURSE PRACTITIONER

## 2024-11-14 PROCEDURE — 3008F BODY MASS INDEX DOCD: CPT | Performed by: NURSE PRACTITIONER

## 2024-11-14 PROCEDURE — 3074F SYST BP LT 130 MM HG: CPT | Performed by: NURSE PRACTITIONER

## 2024-11-14 RX ORDER — SEMAGLUTIDE 0.68 MG/ML
0.5 INJECTION, SOLUTION SUBCUTANEOUS
Qty: 9 ML | Refills: 3 | Status: SHIPPED | OUTPATIENT
Start: 2024-11-14

## 2024-11-14 RX ORDER — GLUCAGON INJECTION, SOLUTION 1 MG/.2ML
INJECTION, SOLUTION SUBCUTANEOUS
Qty: 0.2 ML | Refills: 11 | Status: SHIPPED | OUTPATIENT
Start: 2024-11-14

## 2024-11-14 RX ORDER — INSULIN LISPRO-AABC 100 [IU]/ML
INJECTION, SOLUTION INTRAVENOUS; SUBCUTANEOUS
Qty: 140 ML | Refills: 3 | Status: SHIPPED | OUTPATIENT
Start: 2024-11-14

## 2024-11-14 RX ORDER — SEMAGLUTIDE 0.68 MG/ML
0.5 INJECTION, SOLUTION SUBCUTANEOUS
Qty: 3 ML | Refills: 11 | Status: SHIPPED | OUTPATIENT
Start: 2024-11-14 | End: 2024-11-14 | Stop reason: ENTERED-IN-ERROR

## 2024-11-14 ASSESSMENT — PATIENT HEALTH QUESTIONNAIRE - PHQ9
1. LITTLE INTEREST OR PLEASURE IN DOING THINGS: NOT AT ALL
2. FEELING DOWN, DEPRESSED OR HOPELESS: NOT AT ALL
SUM OF ALL RESPONSES TO PHQ9 QUESTIONS 1 AND 2: 0

## 2024-11-14 ASSESSMENT — ENCOUNTER SYMPTOMS
DIZZINESS: 0
FREQUENCY: 0
CONSTIPATION: 0
FATIGUE: 0
POLYDIPSIA: 0
APPETITE CHANGE: 0
DIARRHEA: 0
ACTIVITY CHANGE: 0
SLEEP DISTURBANCE: 0
NAUSEA: 0
PALPITATIONS: 0
NUMBNESS: 0
SHORTNESS OF BREATH: 0
SEIZURES: 0
NERVOUS/ANXIOUS: 0
WEAKNESS: 0
POLYPHAGIA: 0

## 2024-11-14 ASSESSMENT — PAIN SCALES - GENERAL: PAINLEVEL_OUTOF10: 0-NO PAIN

## 2024-11-14 NOTE — PATIENT INSTRUCTIONS
Type 2 diabetes mellitus, is at goal. A1C 6.5% in office today.   RX changes:   START OZEMPIC 0.25 mg weekly for 4 weeks, then increase to 0.5 mg weekly. We can increase to 1 and 2 mg weekly doses as tolerated.   Basal: 12 AM 1.3 units per hour sensitivity 1: 30  7 AM 1.1 units per hour sensitivity 1 unit per 30 mg/dl  11 AM 1.3 units per hour sensitivity 25 mg/dl  GET LABS   Message me through [employer] so we can titrate insulin with Ozempic.   Education:  blood sugar goals, complications of diabetes mellitus, and hypoglycemia prevention and treatment  Follow up: I recommend diabetes care be 3 months with myself and then Dr Caputo in May 2025.

## 2024-11-14 NOTE — PROGRESS NOTES
"Subjective   Talib Bell is a 29 y.o. male presents today for a follow up of type 2 diabetes.  Initial diagnosis with diabetes was age 8  Known complications include: none    Patient of Hatipoglu and last seen by her 6 months  A1c 6,.5%.  Previous A1c 6.9% May 2024    Current diabetes regimen is as follows:   Tandem insulin pump with Dexcom G7    Patient is using continuous glucose monitor- Dexcom G7  The patient is currently checking the blood glucose as needed    Hypoglycemia frequency: 0.3%  Hypoglycemia awareness: yes     Regarding symptoms of hyperglycemia, the patient is not experiencing symptoms such as polydipsia, polyuria, nocturia, and blurry vision.     Last foot exam: Seen for club feet in the past, but is not seen currently. Denies issues at this time.   Last eye exam: June 2024 Toni Newman MD.   Last urine albumin/UACR: <7 2022. Labs ordered in May 2024.   Lipid profile: Last labs 3 years    Review of Systems   Constitutional:  Negative for activity change, appetite change and fatigue.   Respiratory:  Negative for shortness of breath.    Cardiovascular:  Negative for chest pain, palpitations and leg swelling.   Gastrointestinal:  Negative for constipation, diarrhea and nausea.   Endocrine: Negative for cold intolerance, heat intolerance, polydipsia, polyphagia and polyuria.   Genitourinary:  Negative for frequency.   Musculoskeletal:  Negative for gait problem.   Skin:  Negative for rash.   Neurological:  Negative for dizziness, seizures, weakness and numbness.   Psychiatric/Behavioral:  Negative for sleep disturbance and suicidal ideas. The patient is not nervous/anxious.       Objective    Blood pressure 125/78, pulse 84, temperature 36.2 °C (97.2 °F), temperature source Temporal, height 1.854 m (6' 1\"), weight 107 kg (235 lb).  Physical Exam  Vitals and nursing note reviewed.   HENT:      Head: Atraumatic.   Pulmonary:      Effort: Pulmonary effort is normal.   Skin:     General: Skin is warm. "   Neurological:      General: No focal deficit present.      Mental Status: He is alert and oriented to person, place, and time. Mental status is at baseline.      Gait: Gait normal.   Psychiatric:         Mood and Affect: Mood normal.         Behavior: Behavior normal.         Thought Content: Thought content normal.         Judgment: Judgment normal.             Lab Results   Component Value Date    BILITOT 0.6 08/29/2022    CALCIUM 9.1 08/29/2022    CO2 30 08/29/2022     08/29/2022    CREATININE 0.90 08/29/2022    GLUCOSE 183 (H) 08/29/2022    ALKPHOS 77 08/29/2022    K 4.1 08/29/2022    PROT 6.9 08/29/2022     08/29/2022    AST 23 08/29/2022    ALT 18 08/29/2022    BUN 10 08/29/2022    ANIONGAP 10 08/29/2022    ALBUMIN 4.2 08/29/2022    GFRMALE >90 08/29/2022     Lab Results   Component Value Date    TRIG 77 05/24/2021    CHOL 148 05/24/2021    HDL 60.0 05/24/2021     Lab Results   Component Value Date    HGBA1C 6.9 (A) 05/14/2024    HGBA1C 6.5 (H) 12/01/2023    HGBA1C 7.7 03/15/2023       The ASCVD Risk score (Mitch BURKS, et al., 2019) failed to calculate for the following reasons:    The 2019 ASCVD risk score is only valid for ages 40 to 79          Assessment/Plan   Problem List Items Addressed This Visit    None  Visit Diagnoses       Type 1 diabetes mellitus with hyperglycemia (Multi)    -  Primary    Relevant Orders    POCT glycosylated hemoglobin (Hb A1C) manually resulted (Completed)          Type 2 diabetes mellitus, is at goal. A1C 6.5% in office today.   RX changes:   START OZEMPIC 0.25 mg weekly for 4 weeks, then increase to 0.5 mg weekly. We can increase to 1 and 2 mg weekly doses as tolerated.   Basal: 12 AM 1.3 units per hour sensitivity 1: 30  7 AM 1.1 units per hour sensitivity 1 unit per 30 mg/dl  11 AM 1.3 units per hour sensitivity 25 mg/dl  GET LABS   Message me through [employer] so we can titrate insulin with Ozempic.   Education:  blood sugar goals, complications of diabetes  mellitus, and hypoglycemia prevention and treatment  Follow up: I recommend diabetes care be 3 months with myself and then Dr Caupto in May 2025.

## 2024-12-30 ENCOUNTER — APPOINTMENT (OUTPATIENT)
Dept: PRIMARY CARE | Facility: CLINIC | Age: 29
End: 2024-12-30
Payer: COMMERCIAL

## 2024-12-30 VITALS
SYSTOLIC BLOOD PRESSURE: 130 MMHG | DIASTOLIC BLOOD PRESSURE: 78 MMHG | OXYGEN SATURATION: 97 % | TEMPERATURE: 97.5 F | HEART RATE: 84 BPM

## 2024-12-30 DIAGNOSIS — L64.9 ANDROGENIC ALOPECIA: Primary | ICD-10-CM

## 2024-12-30 PROCEDURE — 99213 OFFICE O/P EST LOW 20 MIN: CPT | Performed by: FAMILY MEDICINE

## 2024-12-30 PROCEDURE — 3078F DIAST BP <80 MM HG: CPT | Performed by: FAMILY MEDICINE

## 2024-12-30 PROCEDURE — 3075F SYST BP GE 130 - 139MM HG: CPT | Performed by: FAMILY MEDICINE

## 2024-12-30 PROCEDURE — 1036F TOBACCO NON-USER: CPT | Performed by: FAMILY MEDICINE

## 2024-12-30 RX ORDER — FINASTERIDE 1 MG/1
1 TABLET, FILM COATED ORAL DAILY
Qty: 90 TABLET | Refills: 3 | Status: SHIPPED | OUTPATIENT
Start: 2024-12-30

## 2024-12-30 ASSESSMENT — ENCOUNTER SYMPTOMS
COUGH: 0
FEVER: 0

## 2024-12-30 NOTE — PROGRESS NOTES
Subjective   Patient ID: Talib Bell is a 29 y.o. male who presents for OTHER (Pt here to discuss hair loss medication ).    HPI     He would like to start a medication to help with hair loss  He has noticed diffuse hair thinning as well as some hair loss involving the apex of his scalp over the past few years.  He had previously tried topical minoxidil for 8 months several years ago  There is a family history of hair loss in his father      Review of Systems   Constitutional:  Negative for fever.   Respiratory:  Negative for cough.        Objective   /78   Pulse 84   Temp 36.4 °C (97.5 °F) (Temporal)   SpO2 97%     Physical Exam  Vitals reviewed.   Constitutional:       General: He is not in acute distress.     Appearance: Normal appearance. He is well-developed.   HENT:      Head: Normocephalic.   Eyes:      Conjunctiva/sclera: Conjunctivae normal.   Cardiovascular:      Rate and Rhythm: Normal rate and regular rhythm.      Heart sounds: Normal heart sounds.   Pulmonary:      Effort: Pulmonary effort is normal.      Breath sounds: Normal breath sounds.   Skin:     Findings: No rash.      Comments: Mild hair thinning apex of scalp   Neurological:      Mental Status: He is alert.   Psychiatric:         Mood and Affect: Mood normal.         Behavior: Behavior normal.         Assessment/Plan   Assessment & Plan  Androgenic alopecia    Orders:    finasteride (Propecia) 1 mg tablet; Take 1 tablet (1 mg) by mouth once daily. Do not crush, chew, or split.    PSA; Future    CBC and Auto Differential; Future    We discussed management options and he would like to try finasteride.  We will start finasteride at 1 mg daily.  Discussed that it would be okay to also use over-the-counter minoxidil topical if needed.  Discussed adverse effects of finasteride as well as the importance of monitoring PSA when taking.  We will check a PSA with next labs.  Recommended scheduling a physical sometime within the next 6 to 9  months and we will plan on rechecking how he is doing on finasteride at that time

## 2025-01-06 DIAGNOSIS — L64.9 ANDROGENIC ALOPECIA: ICD-10-CM

## 2025-01-06 RX ORDER — FINASTERIDE 1 MG/1
1 TABLET, FILM COATED ORAL DAILY
Qty: 90 TABLET | Refills: 3 | Status: SHIPPED | OUTPATIENT
Start: 2025-01-06

## 2025-02-06 ENCOUNTER — PATIENT MESSAGE (OUTPATIENT)
Dept: ENDOCRINOLOGY | Facility: CLINIC | Age: 30
End: 2025-02-06
Payer: COMMERCIAL

## 2025-02-06 DIAGNOSIS — Z79.4 TYPE 2 DIABETES MELLITUS WITH HYPERGLYCEMIA, WITH LONG-TERM CURRENT USE OF INSULIN: Primary | ICD-10-CM

## 2025-02-06 DIAGNOSIS — E11.65 TYPE 2 DIABETES MELLITUS WITH HYPERGLYCEMIA, WITH LONG-TERM CURRENT USE OF INSULIN: Primary | ICD-10-CM

## 2025-02-06 RX ORDER — SEMAGLUTIDE 1.34 MG/ML
1 INJECTION, SOLUTION SUBCUTANEOUS
Qty: 9 ML | Refills: 3 | Status: SHIPPED | OUTPATIENT
Start: 2025-02-06

## 2025-02-26 DIAGNOSIS — E10.9 TYPE 1 DIABETES MELLITUS WITHOUT COMPLICATION: ICD-10-CM

## 2025-02-26 RX ORDER — BLOOD-GLUCOSE SENSOR
EACH MISCELLANEOUS
Qty: 9 EACH | Refills: 1 | Status: SHIPPED | OUTPATIENT
Start: 2025-02-26

## 2025-03-04 ENCOUNTER — TELEPHONE (OUTPATIENT)
Dept: ENDOCRINOLOGY | Facility: CLINIC | Age: 30
End: 2025-03-04
Payer: COMMERCIAL

## 2025-03-04 NOTE — TELEPHONE ENCOUNTER
Received electronic DWO/PO/CMN form from [employer] via [employer] to complete for pump supplies. Form completed on 3/4 and submitted electronically.

## 2025-03-18 ENCOUNTER — HOSPITAL ENCOUNTER (EMERGENCY)
Facility: HOSPITAL | Age: 30
Discharge: HOME | End: 2025-03-18
Payer: COMMERCIAL

## 2025-03-18 VITALS
HEIGHT: 73 IN | TEMPERATURE: 97.5 F | WEIGHT: 210 LBS | BODY MASS INDEX: 27.83 KG/M2 | OXYGEN SATURATION: 99 % | HEART RATE: 69 BPM | RESPIRATION RATE: 18 BRPM | DIASTOLIC BLOOD PRESSURE: 68 MMHG | SYSTOLIC BLOOD PRESSURE: 133 MMHG

## 2025-03-18 DIAGNOSIS — R10.13 EPIGASTRIC ABDOMINAL PAIN: Primary | ICD-10-CM

## 2025-03-18 LAB
ALBUMIN SERPL BCP-MCNC: 4.2 G/DL (ref 3.4–5)
ALP SERPL-CCNC: 52 U/L (ref 33–120)
ALT SERPL W P-5'-P-CCNC: 11 U/L (ref 10–52)
ANION GAP SERPL CALC-SCNC: 11 MMOL/L (ref 10–20)
AST SERPL W P-5'-P-CCNC: 12 U/L (ref 9–39)
BASOPHILS # BLD AUTO: 0.02 X10*3/UL (ref 0–0.1)
BASOPHILS NFR BLD AUTO: 0.2 %
BILIRUB SERPL-MCNC: 0.5 MG/DL (ref 0–1.2)
BUN SERPL-MCNC: 13 MG/DL (ref 6–23)
CALCIUM SERPL-MCNC: 8.7 MG/DL (ref 8.6–10.3)
CHLORIDE SERPL-SCNC: 103 MMOL/L (ref 98–107)
CO2 SERPL-SCNC: 28 MMOL/L (ref 21–32)
CREAT SERPL-MCNC: 0.89 MG/DL (ref 0.5–1.3)
EGFRCR SERPLBLD CKD-EPI 2021: >90 ML/MIN/1.73M*2
EOSINOPHIL # BLD AUTO: 0.25 X10*3/UL (ref 0–0.7)
EOSINOPHIL NFR BLD AUTO: 2.5 %
ERYTHROCYTE [DISTWIDTH] IN BLOOD BY AUTOMATED COUNT: 11.7 % (ref 11.5–14.5)
GLUCOSE SERPL-MCNC: 196 MG/DL (ref 74–99)
HCT VFR BLD AUTO: 42.2 % (ref 41–52)
HGB BLD-MCNC: 14.4 G/DL (ref 13.5–17.5)
HOLD SPECIMEN: NORMAL
HOLD SPECIMEN: NORMAL
IMM GRANULOCYTES # BLD AUTO: 0.03 X10*3/UL (ref 0–0.7)
IMM GRANULOCYTES NFR BLD AUTO: 0.3 % (ref 0–0.9)
LIPASE SERPL-CCNC: 7 U/L (ref 9–82)
LYMPHOCYTES # BLD AUTO: 1.05 X10*3/UL (ref 1.2–4.8)
LYMPHOCYTES NFR BLD AUTO: 10.7 %
MCH RBC QN AUTO: 30.6 PG (ref 26–34)
MCHC RBC AUTO-ENTMCNC: 34.1 G/DL (ref 32–36)
MCV RBC AUTO: 90 FL (ref 80–100)
MONOCYTES # BLD AUTO: 0.65 X10*3/UL (ref 0.1–1)
MONOCYTES NFR BLD AUTO: 6.6 %
NEUTROPHILS # BLD AUTO: 7.83 X10*3/UL (ref 1.2–7.7)
NEUTROPHILS NFR BLD AUTO: 79.7 %
NRBC BLD-RTO: 0 /100 WBCS (ref 0–0)
PLATELET # BLD AUTO: 289 X10*3/UL (ref 150–450)
POTASSIUM SERPL-SCNC: 3.9 MMOL/L (ref 3.5–5.3)
PROT SERPL-MCNC: 6.7 G/DL (ref 6.4–8.2)
RBC # BLD AUTO: 4.71 X10*6/UL (ref 4.5–5.9)
SODIUM SERPL-SCNC: 138 MMOL/L (ref 136–145)
WBC # BLD AUTO: 9.8 X10*3/UL (ref 4.4–11.3)

## 2025-03-18 PROCEDURE — 36415 COLL VENOUS BLD VENIPUNCTURE: CPT

## 2025-03-18 PROCEDURE — 99283 EMERGENCY DEPT VISIT LOW MDM: CPT

## 2025-03-18 PROCEDURE — 80053 COMPREHEN METABOLIC PANEL: CPT

## 2025-03-18 PROCEDURE — 99284 EMERGENCY DEPT VISIT MOD MDM: CPT

## 2025-03-18 PROCEDURE — 83690 ASSAY OF LIPASE: CPT

## 2025-03-18 PROCEDURE — 2500000004 HC RX 250 GENERAL PHARMACY W/ HCPCS (ALT 636 FOR OP/ED)

## 2025-03-18 PROCEDURE — 2500000005 HC RX 250 GENERAL PHARMACY W/O HCPCS

## 2025-03-18 PROCEDURE — 2500000001 HC RX 250 WO HCPCS SELF ADMINISTERED DRUGS (ALT 637 FOR MEDICARE OP)

## 2025-03-18 PROCEDURE — 85025 COMPLETE CBC W/AUTO DIFF WBC: CPT

## 2025-03-18 RX ORDER — PANTOPRAZOLE SODIUM 40 MG/1
40 TABLET, DELAYED RELEASE ORAL
Qty: 30 TABLET | Refills: 0 | Status: SHIPPED | OUTPATIENT
Start: 2025-03-18 | End: 2025-03-20 | Stop reason: SDUPTHER

## 2025-03-18 RX ORDER — ONDANSETRON 4 MG/1
4 TABLET, FILM COATED ORAL EVERY 8 HOURS PRN
Qty: 20 TABLET | Refills: 0 | Status: SHIPPED | OUTPATIENT
Start: 2025-03-18

## 2025-03-18 RX ORDER — ONDANSETRON 4 MG/1
4 TABLET, ORALLY DISINTEGRATING ORAL ONCE
Status: COMPLETED | OUTPATIENT
Start: 2025-03-18 | End: 2025-03-18

## 2025-03-18 RX ORDER — LIDOCAINE HYDROCHLORIDE 20 MG/ML
15 SOLUTION OROPHARYNGEAL ONCE
Status: COMPLETED | OUTPATIENT
Start: 2025-03-18 | End: 2025-03-18

## 2025-03-18 RX ORDER — ALUMINUM HYDROXIDE, MAGNESIUM HYDROXIDE, AND SIMETHICONE 1200; 120; 1200 MG/30ML; MG/30ML; MG/30ML
30 SUSPENSION ORAL ONCE
Status: COMPLETED | OUTPATIENT
Start: 2025-03-18 | End: 2025-03-18

## 2025-03-18 RX ADMIN — LIDOCAINE HYDROCHLORIDE 15 ML: 20 SOLUTION ORAL at 11:25

## 2025-03-18 RX ADMIN — ALUMINUM HYDROXIDE, MAGNESIUM HYDROXIDE, AND SIMETHICONE 30 ML: 1200; 120; 1200 SUSPENSION ORAL at 11:25

## 2025-03-18 RX ADMIN — ONDANSETRON 4 MG: 4 TABLET, ORALLY DISINTEGRATING ORAL at 11:25

## 2025-03-18 ASSESSMENT — PAIN DESCRIPTION - LOCATION: LOCATION: ABDOMEN

## 2025-03-18 ASSESSMENT — PAIN DESCRIPTION - ONSET: ONSET: ONGOING

## 2025-03-18 ASSESSMENT — PAIN DESCRIPTION - PROGRESSION: CLINICAL_PROGRESSION: GRADUALLY WORSENING

## 2025-03-18 ASSESSMENT — COLUMBIA-SUICIDE SEVERITY RATING SCALE - C-SSRS
6. HAVE YOU EVER DONE ANYTHING, STARTED TO DO ANYTHING, OR PREPARED TO DO ANYTHING TO END YOUR LIFE?: NO
2. HAVE YOU ACTUALLY HAD ANY THOUGHTS OF KILLING YOURSELF?: NO
1. IN THE PAST MONTH, HAVE YOU WISHED YOU WERE DEAD OR WISHED YOU COULD GO TO SLEEP AND NOT WAKE UP?: NO

## 2025-03-18 ASSESSMENT — LIFESTYLE VARIABLES
HAVE PEOPLE ANNOYED YOU BY CRITICIZING YOUR DRINKING: NO
EVER FELT BAD OR GUILTY ABOUT YOUR DRINKING: NO
HAVE YOU EVER FELT YOU SHOULD CUT DOWN ON YOUR DRINKING: NO
EVER HAD A DRINK FIRST THING IN THE MORNING TO STEADY YOUR NERVES TO GET RID OF A HANGOVER: NO
TOTAL SCORE: 0

## 2025-03-18 ASSESSMENT — PAIN - FUNCTIONAL ASSESSMENT: PAIN_FUNCTIONAL_ASSESSMENT: 0-10

## 2025-03-18 ASSESSMENT — PAIN DESCRIPTION - DESCRIPTORS: DESCRIPTORS: BURNING;CRAMPING

## 2025-03-18 ASSESSMENT — PAIN SCALES - GENERAL: PAINLEVEL_OUTOF10: 2

## 2025-03-18 ASSESSMENT — PAIN DESCRIPTION - FREQUENCY: FREQUENCY: CONSTANT/CONTINUOUS

## 2025-03-18 NOTE — ED PROVIDER NOTES
"HPI   Chief Complaint   Patient presents with    Abdominal Pain     Patient states 3 days of abdominal pain vomiting and diarrhea states \"I think I have an ulcer, I use to take a pill for to much acid in my stomach\"       Patient is a 29-year-old male with PMH of type 1 diabetes that is well-controlled and some mild gastroparesis presenting today for abdominal pain.  States that he has had stomach issues in the past and told that he \"likely had a stomach ulcer\".  States that they sent him home with an acid reducer and eliminated the problem.  Symptoms back for the past few days.  States that it is an intense epigastric abdominal pain that he describes as \"acidic and burning\".  Does not radiate.  States that last night, he forced himself to throw up which did relieve some of his pain and symptoms but immediately came back.  Notes that this morning, he stomach pain was so intense that he did feel nauseous and throw up without forcing it.  No blood in his emesis.  Denies black or tarry stools.  Denies fatigue, weakness, lightheadedness, fever or chills.  Has been having ongoing issues with diarrhea but does not believe that this is related.  States that sometimes when he does not eat well, he will have episodes of diarrhea.  Notes that pain typically gets better when he eats something.              Patient History   Past Medical History:   Diagnosis Date    Other conditions influencing health status 08/24/2022    Diabetes mellitus type 1, uncontrolled     Past Surgical History:   Procedure Laterality Date    OTHER SURGICAL HISTORY  06/09/2021    Foot surgery     No family history on file.  Social History     Tobacco Use    Smoking status: Never    Smokeless tobacco: Never   Substance Use Topics    Alcohol use: Not Currently    Drug use: Not Currently     Frequency: 3.0 times per week     Types: Marijuana       Physical Exam   ED Triage Vitals [03/18/25 1024]   Temperature Heart Rate Respirations BP   36.4 °C (97.5 °F) " 69 18 152/79      Pulse Ox Temp Source Heart Rate Source Patient Position   98 % Temporal Monitor Sitting      BP Location FiO2 (%)     Left arm --       Physical Exam  Vitals and nursing note reviewed.   Constitutional:       General: He is not in acute distress.     Appearance: Normal appearance. He is not ill-appearing.   HENT:      Head: Normocephalic and atraumatic.      Right Ear: External ear normal.      Left Ear: External ear normal.      Nose: Nose normal.      Mouth/Throat:      Mouth: Mucous membranes are moist.   Eyes:      Extraocular Movements: Extraocular movements intact.      Conjunctiva/sclera: Conjunctivae normal.      Pupils: Pupils are equal, round, and reactive to light.   Cardiovascular:      Rate and Rhythm: Normal rate and regular rhythm.      Heart sounds: Normal heart sounds.   Pulmonary:      Effort: No accessory muscle usage or respiratory distress.      Breath sounds: Normal breath sounds. No wheezing, rhonchi or rales.   Abdominal:      General: Abdomen is flat. Bowel sounds are normal. There is no distension.      Palpations: Abdomen is soft.      Tenderness: There is abdominal tenderness in the epigastric area. There is no right CVA tenderness or left CVA tenderness.   Musculoskeletal:         General: No swelling or deformity. Normal range of motion.      Cervical back: Normal range of motion and neck supple.      Right lower leg: No edema.      Left lower leg: No edema.   Skin:     General: Skin is warm and dry.      Capillary Refill: Capillary refill takes less than 2 seconds.   Neurological:      General: No focal deficit present.      Mental Status: He is alert and oriented to person, place, and time.      GCS: GCS eye subscore is 4. GCS verbal subscore is 5. GCS motor subscore is 6.      Cranial Nerves: Cranial nerves 2-12 are intact.      Sensory: No sensory deficit.      Motor: Motor function is intact. No weakness.   Psychiatric:         Mood and Affect: Mood and affect  normal.         Speech: Speech normal.         Behavior: Behavior normal. Behavior is cooperative.           ED Course & MDM   Diagnoses as of 03/18/25 1215   Epigastric abdominal pain                 No data recorded     Shahid Coma Scale Score: 15 (03/18/25 1025 : Mallory Escobedo LPN)                           Medical Decision Making  29-year-old male presenting today for epigastric abdominal pain.  No hematemesis, no black or tarry stools.  On exam, epigastric abdomen mildly tender.  Negative Mcleod sign, no tenderness to right upper quadrant.  No rigidity or guarding.  Vital stable, he is afebrile, does not meet SIRS criteria.  Symptoms more consistent with gastritis versus PUD.  Will obtain labs to rule out other abnormalities or acute causes for his pain including pancreatitis.  Will give patient ODT Zofran and GI cocktail and reevaluate his symptoms.  Considered imaging though discussed with patient and opted to defer imaging at this time as I have low suspicion for acute abdomen.    CBC without elevated white count.  No acute anemia.  CMP showing no electrolyte abnormalities, RONAK, transaminitis.  Lipase within normal limits.  On reevaluation, patient feeling much better after ODT Zofran and GI cocktail confirming that this is likely more gastritis or PUD.  Plan for patient to be discharged home.  Will start patient on 1 month supply of Protonix.  Also sent in as needed Zofran and discussed as needed Pepcid.  Will give patient referral to gastroenteritis considering his ongoing issues with his epigastric abdominal pain and his needed PPIs in the past.        Procedure  Procedures     Clari Peña PA-C  03/18/25 1215

## 2025-03-18 NOTE — DISCHARGE INSTRUCTIONS
Follow up with GI. Referral #: [phone] for appointment  Take Protonix daily.  Can take Pepcid nightly as needed.  Sent in Zofran as needed for nausea and vomiting.  Symptoms worsen, you can go back to the ER.

## 2025-03-19 ENCOUNTER — TELEPHONE (OUTPATIENT)
Dept: PRIMARY CARE | Facility: CLINIC | Age: 30
End: 2025-03-19

## 2025-03-19 ENCOUNTER — APPOINTMENT (OUTPATIENT)
Dept: OTOLARYNGOLOGY | Facility: CLINIC | Age: 30
End: 2025-03-19
Payer: COMMERCIAL

## 2025-03-19 NOTE — TELEPHONE ENCOUNTER
Patient has been in the ER X 2 in the past few days for Intense stomach pain, burning sensation    He is wondering if he can be seen in the next few days      Please Advise      [phone]

## 2025-03-20 ENCOUNTER — OFFICE VISIT (OUTPATIENT)
Dept: PRIMARY CARE | Facility: CLINIC | Age: 30
End: 2025-03-20
Payer: COMMERCIAL

## 2025-03-20 VITALS
DIASTOLIC BLOOD PRESSURE: 84 MMHG | WEIGHT: 216 LBS | HEART RATE: 64 BPM | SYSTOLIC BLOOD PRESSURE: 142 MMHG | OXYGEN SATURATION: 97 % | BODY MASS INDEX: 28.5 KG/M2 | TEMPERATURE: 97.6 F

## 2025-03-20 DIAGNOSIS — R10.13 EPIGASTRIC ABDOMINAL PAIN: ICD-10-CM

## 2025-03-20 PROCEDURE — 1036F TOBACCO NON-USER: CPT | Performed by: FAMILY MEDICINE

## 2025-03-20 PROCEDURE — 3079F DIAST BP 80-89 MM HG: CPT | Performed by: FAMILY MEDICINE

## 2025-03-20 PROCEDURE — 3077F SYST BP >= 140 MM HG: CPT | Performed by: FAMILY MEDICINE

## 2025-03-20 PROCEDURE — 99214 OFFICE O/P EST MOD 30 MIN: CPT | Performed by: FAMILY MEDICINE

## 2025-03-20 RX ORDER — PANTOPRAZOLE SODIUM 40 MG/1
40 TABLET, DELAYED RELEASE ORAL
Qty: 30 TABLET | Refills: 1 | Status: SHIPPED | OUTPATIENT
Start: 2025-03-20

## 2025-03-20 RX ORDER — SUCRALFATE 1 G/1
1 TABLET ORAL
Qty: 56 TABLET | Refills: 0 | Status: SHIPPED | OUTPATIENT
Start: 2025-03-20 | End: 2025-04-03

## 2025-03-20 RX ORDER — SUCRALFATE 1 G/1
1 TABLET ORAL 4 TIMES DAILY
COMMUNITY
Start: 2025-03-19 | End: 2025-03-20 | Stop reason: SDUPTHER

## 2025-03-20 ASSESSMENT — ENCOUNTER SYMPTOMS
VOMITING: 1
NAUSEA: 0
FEVER: 0
ABDOMINAL PAIN: 1

## 2025-03-20 NOTE — PROGRESS NOTES
Subjective   Patient ID: Talib Bell is a 29 y.o. male who presents for Abdominal Pain (ER believes this is a ulcer./Was given a RX for sucralfate to help the stomach second time at the ER which has helped a lot. ).    Abdominal Pain  This is a new problem. The pain is at a severity of 1/10. The abdominal pain radiates to the epigastric region (middle). Associated symptoms include vomiting. Pertinent negatives include no fever or nausea.      He is here today to follow-up on 2 different visits to the ED this week for abdominal pain  His abdominal pain had initially developed on Friday of last week.  He had epigastric pain which had lasted approximately 1 hour and then went away  This got better over the weekend, but then the pain started to return, and he went to the ED on 3/18  At that time he was having pain in his epigastric region which was severe.  No nausea, but he had vomited due to the pain.  He had labs including CBC, CMP and lipase which were unremarkable.  He was discharged with pantoprazole, as well as Zofran and Pepcid as needed  The pain had been better, but then started to worsen, and he returned to the ED on 3/19.  In the ED he had repeat labs including CBC, CMP and lipase which were unremarkable.  He was given Carafate and discharged home  He is feeling much better since being seen in the ED yesterday.  Currently his pain is mild and more located in his lower abdomen.  No nausea or vomiting  He is scheduled to see gastroenterology in May  He did have an episode of epigastric pain approximately 4 years ago which was not as severe.  This had resolved with pantoprazole  He follows with endocrinology for type 1 diabetes mellitus.  He is currently taking Ozempic which was started in November.  Last A1c was 6.5%          Review of Systems   Constitutional:  Negative for fever.   Gastrointestinal:  Positive for abdominal pain and vomiting. Negative for nausea.       Objective   /84   Pulse 64    Temp 36.4 °C (97.6 °F) (Temporal)   Wt 98 kg (216 lb)   SpO2 97%   BMI 28.50 kg/m²     Physical Exam  Vitals reviewed.   Constitutional:       General: He is not in acute distress.     Appearance: Normal appearance. He is well-developed.   HENT:      Head: Normocephalic.   Eyes:      Conjunctiva/sclera: Conjunctivae normal.   Cardiovascular:      Rate and Rhythm: Normal rate and regular rhythm.      Heart sounds: Normal heart sounds.   Pulmonary:      Effort: Pulmonary effort is normal.      Breath sounds: Normal breath sounds.   Abdominal:      Palpations: Abdomen is soft.      Tenderness: There is abdominal tenderness.      Comments: Mild tenderness in epigastric region.  Abdomen is soft   Skin:     Findings: No rash.   Neurological:      Mental Status: He is alert.   Psychiatric:         Mood and Affect: Mood normal.         Behavior: Behavior normal.         Assessment/Plan   Assessment & Plan  Epigastric abdominal pain    Orders:    sucralfate (Carafate) 1 gram tablet; Take 1 tablet (1 g) by mouth 4 times a day before meals for 14 days.    pantoprazole (ProtoNix) 40 mg EC tablet; Take 1 tablet (40 mg) by mouth once daily in the morning. Take before meals. Do not crush, chew, or split.    He presents today for follow-up on 2 different visits this week to the ED for epigastric pain.  Reviewed records and labs from the ED, and he has had labs including CBC, CMP and lipase which were all unremarkable.  He has since been started on pantoprazole and also Carafate and his pain is getting much better.  We discussed potential causes for his symptoms including GERD, gastritis, and peptic ulcer disease.  I recommended that he continue pantoprazole for a total of 2 months.  He is going to continue the Carafate for a total of 14 days.  I did send in a refill on the Carafate for him to take in case the pain returns after completing this course  He is going to be seeing GI in May to discuss further  Recommended returning  to the ER if any severe pain, or follow-up with me if the pain does not continue to improve over the next several weeks  We also did discuss that some of his symptoms may be a side effect of Ozempic, and he is going to discuss this further when he sees his endocrinologist  If otherwise doing well, we will plan on rechecking his symptoms at his next appointment in [place]

## 2025-03-27 ENCOUNTER — APPOINTMENT (OUTPATIENT)
Dept: ENDOCRINOLOGY | Facility: CLINIC | Age: 30
End: 2025-03-27
Payer: COMMERCIAL

## 2025-04-09 ENCOUNTER — APPOINTMENT (OUTPATIENT)
Dept: OTOLARYNGOLOGY | Facility: CLINIC | Age: 30
End: 2025-04-09
Payer: COMMERCIAL

## 2025-04-12 DIAGNOSIS — R10.13 EPIGASTRIC ABDOMINAL PAIN: ICD-10-CM

## 2025-04-14 RX ORDER — PANTOPRAZOLE SODIUM 40 MG/1
TABLET, DELAYED RELEASE ORAL
Qty: 90 TABLET | Refills: 1 | Status: SHIPPED | OUTPATIENT
Start: 2025-04-14

## 2025-04-22 ENCOUNTER — TELEPHONE (OUTPATIENT)
Dept: PRIMARY CARE | Facility: CLINIC | Age: 30
End: 2025-04-22

## 2025-04-22 ENCOUNTER — OFFICE VISIT (OUTPATIENT)
Dept: PRIMARY CARE | Facility: CLINIC | Age: 30
End: 2025-04-22
Payer: COMMERCIAL

## 2025-04-22 VITALS
SYSTOLIC BLOOD PRESSURE: 129 MMHG | DIASTOLIC BLOOD PRESSURE: 77 MMHG | BODY MASS INDEX: 28.63 KG/M2 | HEART RATE: 76 BPM | OXYGEN SATURATION: 98 % | WEIGHT: 217 LBS

## 2025-04-22 DIAGNOSIS — E10.9 CONTROLLED DIABETES MELLITUS TYPE 1 WITHOUT COMPLICATIONS: ICD-10-CM

## 2025-04-22 DIAGNOSIS — Z00.00 ENCOUNTER FOR WELL ADULT EXAM WITHOUT ABNORMAL FINDINGS: Primary | ICD-10-CM

## 2025-04-22 DIAGNOSIS — M62.569 ATROPHY OF CALF MUSCLES: ICD-10-CM

## 2025-04-22 DIAGNOSIS — Z13.220 LIPID SCREENING: ICD-10-CM

## 2025-04-22 DIAGNOSIS — Z13.21 ENCOUNTER FOR VITAMIN DEFICIENCY SCREENING: ICD-10-CM

## 2025-04-22 PROCEDURE — 3078F DIAST BP <80 MM HG: CPT | Performed by: FAMILY MEDICINE

## 2025-04-22 PROCEDURE — 93000 ELECTROCARDIOGRAM COMPLETE: CPT | Performed by: FAMILY MEDICINE

## 2025-04-22 PROCEDURE — 1036F TOBACCO NON-USER: CPT | Performed by: FAMILY MEDICINE

## 2025-04-22 PROCEDURE — 3074F SYST BP LT 130 MM HG: CPT | Performed by: FAMILY MEDICINE

## 2025-04-22 PROCEDURE — 99395 PREV VISIT EST AGE 18-39: CPT | Performed by: FAMILY MEDICINE

## 2025-04-22 ASSESSMENT — ENCOUNTER SYMPTOMS
WEAKNESS: 0
CHILLS: 0
SHORTNESS OF BREATH: 0
BLOOD IN STOOL: 0
ABDOMINAL PAIN: 0
COUGH: 0
BACK PAIN: 0
DIZZINESS: 0
FREQUENCY: 0
RHINORRHEA: 0
WHEEZING: 0
DYSURIA: 0
VOMITING: 0
PALPITATIONS: 0
HEADACHES: 0
DIARRHEA: 0
NAUSEA: 0
FEVER: 0
NUMBNESS: 0

## 2025-04-22 ASSESSMENT — PATIENT HEALTH QUESTIONNAIRE - PHQ9
2. FEELING DOWN, DEPRESSED OR HOPELESS: NOT AT ALL
1. LITTLE INTEREST OR PLEASURE IN DOING THINGS: NOT AT ALL
SUM OF ALL RESPONSES TO PHQ9 QUESTIONS 1 AND 2: 0

## 2025-04-22 NOTE — PROGRESS NOTES
Subjective   Patient ID: Talib Bell is a 29 y.o. male who presents for Leg Problem (Pt is here for surgical clearance/DOS 5/21/2025).    HPI     He is here today for his annual physical and also surgical clearance  He is going to be having silicone calf muscle implants on 5/21/2025  Surgeon is Dr. Pepe.  This is going to be done under general anesthesia in [place]  It was recommended that he get surgical clearance, have an H&H checked, EKG and chest x-ray if indicated, as well as a screen for sleep apnea    He does not snore.  There is no witnessed apneas or nocturnal gasping.  He sleeps well and feels well rested in the mornings  Denies any chest pain, palpitations, cough, shortness of breath or respiratory symptoms  Most recent CBC checked 3/19/2025 was normal  He has been taking pantoprazole, and states that his abdominal pain has resolved since his last visit 1 month ago  He does not smoke  He currently follows with endocrinology for type 1 diabetes mellitus.  He is currently on an insulin pump and also Ozempic  Last A1c checked in November was 6.5%.  His predicted A1c according to his more recent readings is approximately 7.4%    Allergies: He is allergic to codeine.  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  Has also had wisdom teeth taken out  No problems with anesthesia in the past, but he does mention that he woke up when he was under anesthesia for his wisdom teeth  He has underdeveloped calf muscles on both legs which is felt to be related to his previous clubfoot surgery        No history of ischemic heart disease, congestive heart failure, TIA/CVA, or renal insufficiency.  Functional capacity: Is able to climb 2 flights of stairs without stopping due to limiting symptoms  Symptoms: denies chest pain, palpitations, dyspnea, or dizziness      He has been doing well with diet and exercise  Last Tdap and pneumococcal vaccine were both given in 2021      Patient  Health Questionnaire-2 Score: 0 (4/22/2025  3:37 PM)        Review of Systems   Constitutional:  Negative for chills and fever.   HENT:  Negative for congestion and rhinorrhea.    Eyes:  Negative for visual disturbance.   Respiratory:  Negative for cough, shortness of breath and wheezing.    Cardiovascular:  Negative for chest pain, palpitations and leg swelling.   Gastrointestinal:  Negative for abdominal pain, blood in stool, diarrhea, nausea and vomiting.   Genitourinary:  Negative for dysuria, frequency and urgency.   Musculoskeletal:  Negative for back pain.   Skin:  Negative for rash.   Neurological:  Negative for dizziness, weakness, numbness and headaches.       Objective   /77   Pulse 76   Wt 98.4 kg (217 lb)   SpO2 98%   BMI 28.63 kg/m²     Physical Exam  Vitals reviewed.   Constitutional:       General: He is not in acute distress.     Appearance: Normal appearance. He is well-developed.   HENT:      Head: Normocephalic.      Right Ear: Tympanic membrane, ear canal and external ear normal.      Left Ear: Tympanic membrane, ear canal and external ear normal.      Nose: Nose normal.      Mouth/Throat:      Mouth: Mucous membranes are moist.   Eyes:      Conjunctiva/sclera: Conjunctivae normal.   Neck:      Thyroid: No thyromegaly.      Vascular: No JVD.   Cardiovascular:      Rate and Rhythm: Normal rate and regular rhythm.      Heart sounds: Normal heart sounds.   Pulmonary:      Effort: Pulmonary effort is normal.      Breath sounds: Normal breath sounds.   Abdominal:      Palpations: Abdomen is soft.      Tenderness: There is no abdominal tenderness.   Musculoskeletal:         General: No tenderness.      Right lower leg: No edema.      Left lower leg: No edema.      Comments: No calf muscle tenderness, or erythema present.  Ankle dorsiflexion and plantarflexion are both 5 out of 5 bilaterally   Lymphadenopathy:      Cervical: No cervical adenopathy.   Skin:     Findings: No rash.    Neurological:      Mental Status: He is alert and oriented to person, place, and time.   Psychiatric:         Mood and Affect: Mood normal.         Behavior: Behavior normal.         Assessment/Plan   Assessment & Plan  Encounter for vitamin deficiency screening    Orders:    CBC and Auto Differential; Future    Basic Metabolic Panel; Future    Hemoglobin A1C; Future    Albumin-Creatinine Ratio, Urine Random; Future    ECG 12 Lead    Vitamin D 25-Hydroxy,Total (for eval of Vitamin D levels); Future    Lipid screening    Orders:    CBC and Auto Differential; Future    Basic Metabolic Panel; Future    Hemoglobin A1C; Future    Albumin-Creatinine Ratio, Urine Random; Future    ECG 12 Lead    Lipid Panel; Future    Atrophy of calf muscles    Orders:    CBC and Auto Differential; Future    Basic Metabolic Panel; Future    Hemoglobin A1C; Future    Albumin-Creatinine Ratio, Urine Random; Future    ECG 12 Lead    Controlled diabetes mellitus type 1 without complications    Orders:    CBC and Auto Differential; Future    Basic Metabolic Panel; Future    Hemoglobin A1C; Future    Albumin-Creatinine Ratio, Urine Random; Future    ECG 12 Lead    Encounter for well adult exam without abnormal findings         He is at acceptable cardiopulmonary risk for upcoming calf muscle surgery on 5/21/2025, and is cleared for surgery  EKG today is normal sinus rhythm with heart rate of 70, no LVH or acute ischemic changes seen  He does not have any symptoms concerning for sleep apnea  No cough, wheezing or other respiratory symptoms, so I do not feel that a chest x-ray is needed  His most recent CBC checked 1 month ago showed normal hemoglobin and hematocrit  We will check preop labs including CBC and BMP  I did recommend that he contact his endocrinologist to get perioperative insulin dosing instructions.  We will check an A1c with his next labs recommended holding Ozempic for 1 week prior to his surgery  Also recommended avoiding  NSAIDs 7 days prior to surgery  He is due for a screening cholesterol so we will add this to his lab order.  He has a history of low vitamin D, so we will also check a vitamin D level  He is up-to-date on tetanus vaccine  Continue healthy diet and regular exercise  Follow-up in 1 year for next CPE

## 2025-04-22 NOTE — ASSESSMENT & PLAN NOTE
Orders:    CBC and Auto Differential; Future    Basic Metabolic Panel; Future    Hemoglobin A1C; Future    Albumin-Creatinine Ratio, Urine Random; Future    ECG 12 Lead

## 2025-05-02 LAB
25(OH)D3+25(OH)D2 SERPL-MCNC: 61 NG/ML (ref 30–100)
ANION GAP SERPL CALCULATED.4IONS-SCNC: 4 MMOL/L (CALC) (ref 7–17)
BASOPHILS # BLD AUTO: 19 CELLS/UL (ref 0–200)
BASOPHILS NFR BLD AUTO: 0.4 %
BUN SERPL-MCNC: 11 MG/DL (ref 7–25)
BUN/CREAT SERPL: ABNORMAL (CALC) (ref 6–22)
CALCIUM SERPL-MCNC: 9.6 MG/DL (ref 8.6–10.3)
CHLORIDE SERPL-SCNC: 105 MMOL/L (ref 98–110)
CHOLEST SERPL-MCNC: 154 MG/DL
CHOLEST/HDLC SERPL: 2.6 (CALC)
CO2 SERPL-SCNC: 32 MMOL/L (ref 20–32)
CREAT SERPL-MCNC: 1.06 MG/DL (ref 0.6–1.24)
EGFRCR SERPLBLD CKD-EPI 2021: 99 ML/MIN/1.73M2
EOSINOPHIL # BLD AUTO: 320 CELLS/UL (ref 15–500)
EOSINOPHIL NFR BLD AUTO: 6.8 %
ERYTHROCYTE [DISTWIDTH] IN BLOOD BY AUTOMATED COUNT: 12.4 % (ref 11–15)
EST. AVERAGE GLUCOSE BLD GHB EST-MCNC: 143 MG/DL
EST. AVERAGE GLUCOSE BLD GHB EST-SCNC: 7.9 MMOL/L
GLUCOSE SERPL-MCNC: 117 MG/DL (ref 65–99)
HBA1C MFR BLD: 6.6 %
HCT VFR BLD AUTO: 43.4 % (ref 38.5–50)
HDLC SERPL-MCNC: 59 MG/DL
HGB BLD-MCNC: 14.6 G/DL (ref 13.2–17.1)
LDLC SERPL CALC-MCNC: 81 MG/DL (CALC)
LYMPHOCYTES # BLD AUTO: 2054 CELLS/UL (ref 850–3900)
LYMPHOCYTES NFR BLD AUTO: 43.7 %
MCH RBC QN AUTO: 30.6 PG (ref 27–33)
MCHC RBC AUTO-ENTMCNC: 33.6 G/DL (ref 32–36)
MCV RBC AUTO: 91 FL (ref 80–100)
MONOCYTES # BLD AUTO: 616 CELLS/UL (ref 200–950)
MONOCYTES NFR BLD AUTO: 13.1 %
NEUTROPHILS # BLD AUTO: 1692 CELLS/UL (ref 1500–7800)
NEUTROPHILS NFR BLD AUTO: 36 %
NONHDLC SERPL-MCNC: 95 MG/DL (CALC)
PLATELET # BLD AUTO: 284 THOUSAND/UL (ref 140–400)
PMV BLD REES-ECKER: 8.9 FL (ref 7.5–12.5)
POTASSIUM SERPL-SCNC: 4.5 MMOL/L (ref 3.5–5.3)
RBC # BLD AUTO: 4.77 MILLION/UL (ref 4.2–5.8)
SODIUM SERPL-SCNC: 141 MMOL/L (ref 135–146)
TRIGL SERPL-MCNC: 63 MG/DL
WBC # BLD AUTO: 4.7 THOUSAND/UL (ref 3.8–10.8)

## 2025-05-15 ENCOUNTER — APPOINTMENT (OUTPATIENT)
Dept: ENDOCRINOLOGY | Facility: CLINIC | Age: 30
End: 2025-05-15
Payer: COMMERCIAL

## 2025-05-17 LAB
ALBUMIN/CREAT UR: NORMAL MG/G CREAT
CREAT UR-MCNC: 29 MG/DL (ref 20–320)
MICROALBUMIN UR-MCNC: <0.2 MG/DL

## 2025-05-20 ENCOUNTER — PATIENT MESSAGE (OUTPATIENT)
Dept: ENDOCRINOLOGY | Facility: CLINIC | Age: 30
End: 2025-05-20
Payer: COMMERCIAL

## 2025-05-27 ENCOUNTER — APPOINTMENT (OUTPATIENT)
Dept: ENDOCRINOLOGY | Facility: CLINIC | Age: 30
End: 2025-05-27
Payer: COMMERCIAL

## 2025-06-06 ENCOUNTER — OFFICE VISIT (OUTPATIENT)
Dept: GASTROENTEROLOGY | Facility: CLINIC | Age: 30
End: 2025-06-06
Payer: COMMERCIAL

## 2025-06-06 VITALS
BODY MASS INDEX: 28.1 KG/M2 | TEMPERATURE: 97.6 F | SYSTOLIC BLOOD PRESSURE: 116 MMHG | DIASTOLIC BLOOD PRESSURE: 69 MMHG | WEIGHT: 213 LBS | HEART RATE: 111 BPM

## 2025-06-06 DIAGNOSIS — R10.13 EPIGASTRIC ABDOMINAL PAIN: Primary | ICD-10-CM

## 2025-06-06 PROCEDURE — 99204 OFFICE O/P NEW MOD 45 MIN: CPT | Performed by: NURSE PRACTITIONER

## 2025-06-06 PROCEDURE — 3074F SYST BP LT 130 MM HG: CPT | Performed by: NURSE PRACTITIONER

## 2025-06-06 PROCEDURE — 99213 OFFICE O/P EST LOW 20 MIN: CPT | Performed by: NURSE PRACTITIONER

## 2025-06-06 PROCEDURE — 3078F DIAST BP <80 MM HG: CPT | Performed by: NURSE PRACTITIONER

## 2025-06-06 RX ORDER — SUCRALFATE 1 G/1
1 TABLET ORAL 4 TIMES DAILY
Qty: 90 TABLET | Refills: 1 | Status: SHIPPED | OUTPATIENT
Start: 2025-06-06

## 2025-06-06 ASSESSMENT — ENCOUNTER SYMPTOMS: DEPRESSION: 0

## 2025-06-06 ASSESSMENT — PAIN SCALES - GENERAL: PAINLEVEL_OUTOF10: 0-NO PAIN

## 2025-06-06 NOTE — PATIENT INSTRUCTIONS
I suspect you had gastritis from uncontrolled reflux since you improved on the pantoprazole/sucralfate. You likely have some underlying gastroparesis as well due to your diabetes/Ozempic, however this does not appear to be significantly impacting you at this time.    Recommendations  Attempt to wean pantoprazole. Decrease to every other day for 1-2 weeks, then every 2 days for 1-2 weeks, then stop.  Continue sucralfate as needed.  Notify office if symptoms return and occur twice a week or more consistently. At that point would recommend EGD to further evaluate versus holding Ozempic to see if this is contributing. Please call the office at [phone] with any questions or concerns.

## 2025-06-06 NOTE — PROGRESS NOTES
"  Talib Bell is a 29 y.o. male with past medical history of type 1 diabetes who is referred by Clari PANDYA for epigastric abdominal pain. He reports epigastric pain that started in March. Felt like burning. Given sucralfate which helped a lot. He has also been on pantoprazole daily since that time, decreased caffeine, acidic foods, and alcohol. He has improved and is only having breakthrough symptoms about 3-4 days a month, and these episodes are not as severe as it initially was. No vomiting. He has been taking Ozempic since October. Lipase is not elevated. He feels he tolerates this well, although he has \"trapped gas\" occasionally. No NSAIDS. No prior EGD or colonoscopy. 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    Social history: Infrequent tobacco. A few alcoholic drinks a week.    Family history: Denies family history of colon cancer or other GI disorders or malignancy.     Current Medications[1]      Review of Systems  Review of Systems negative except as noted in HPI.    Objective     /69   Pulse (!) 111   Temp 36.4 °C (97.6 °F)   Wt 96.6 kg (213 lb)   BMI 28.10 kg/m²      Physical Exam  Constitutional:  No acute distress. Normal appearance. Not ill-appearing.  HENT:  Head normocephalic and atraumatic. Conjunctivae normal.  Cardiovascular:  Normal rate. Regular rhythm.  Pulmonary:  Pulmonary effort normal. No respiratory distress. Breath sounds clear.  Abdominal:  Abdomen is flat and soft. There is no distension. No tenderness or guarding.  Skin: Dry.  Neurological:  Alert and oriented.  Psychiatric:  Mood and affect normal.    Assessment/Plan     29 y.o. male with history of type 1 diabetes on Ozempic who presents today for initial clinic visit for several month history of burning epigastric discomfort that has improved with PPI therapy/sucralfate. Suspect he had gastritis. He probably also has some underlying gastroparesis due to his hx of diabetes/Ozempic. " However he is now pretty much asymptomatic and seems to be tolerating his medication well.    Recommendations  Attempt to wean pantoprazole. Decrease to every other day for 1-2 weeks, then every 2 days for 1-2 weeks, then stop.  Continue sucralfate as needed.  Notify office if symptoms return. If that occurs, consider EGD to further evaluate.    Electronically signed by: Chantal Carter CNP on 6/6/2025 at 2:32 PM             [1]   Current Outpatient Medications   Medication Sig Dispense Refill    buPROPion XL (Wellbutrin XL) 300 mg 24 hr tablet Take 1 tablet (300 mg) by mouth once daily.      Dexcom G7 Sensor device CHANGE SENSOR EVERY 10 DAYS AS DIRECTED 9 each 1    finasteride (Propecia) 1 mg tablet Take 1 tablet (1 mg) by mouth once daily. Do not crush, chew, or split. 90 tablet 3    glucagon (Gvoke HypoPen 1-Pack) 1 mg/0.2 mL auto-injector Use as directed for severe hypoglycemia 0.2 mL 11    glucose 4 gram chewable tablet Chew.      insulin lispro-aabc (Lyumjev U-100 Insulin) 100 unit/mL solution Infuse up to 150 units per day via insulin pump 140 mL 3    ondansetron (Zofran) 4 mg tablet Take 1 tablet (4 mg) by mouth every 8 hours if needed for nausea or vomiting. 20 tablet 0    pantoprazole (ProtoNix) 40 mg EC tablet TAKE 1 TABLET (40 MG) BY MOUTH DAILY IN THE MORNING-BEFORE A MEAL-DO NOT CRUSH, CHEW, OR SPLIT. 90 tablet 1    semaglutide (Ozempic) 1 mg/dose (4 mg/3 mL) pen injector Inject 1 mg under the skin every 7 days. 9 mL 3    acetone, urine, test (Ketostix) strip USE AS DIRECTED. (Patient not taking: Reported on 6/6/2025)      sucralfate (Carafate) 1 gram tablet Take 1 tablet (1 g) by mouth 4 times a day. Take 1 hour before meals and at bedtime 90 tablet 1     No current facility-administered medications for this visit.

## 2025-06-11 ENCOUNTER — TELEPHONE (OUTPATIENT)
Dept: PRIMARY CARE | Facility: CLINIC | Age: 30
End: 2025-06-11
Payer: COMMERCIAL

## 2025-06-11 DIAGNOSIS — E55.9 VITAMIN D DEFICIENCY: Primary | ICD-10-CM

## 2025-06-11 NOTE — TELEPHONE ENCOUNTER
Pt states he received a $300 bill for vitamin d. States he needs a diagnostic diagnosis added. Please advise.

## 2025-06-17 ENCOUNTER — APPOINTMENT (OUTPATIENT)
Dept: PRIMARY CARE | Facility: CLINIC | Age: 30
End: 2025-06-17
Payer: COMMERCIAL

## 2025-06-25 ENCOUNTER — APPOINTMENT (OUTPATIENT)
Dept: ENDOCRINOLOGY | Facility: CLINIC | Age: 30
End: 2025-06-25
Payer: COMMERCIAL

## 2025-06-25 VITALS
DIASTOLIC BLOOD PRESSURE: 73 MMHG | SYSTOLIC BLOOD PRESSURE: 120 MMHG | BODY MASS INDEX: 27.92 KG/M2 | TEMPERATURE: 97.9 F | HEART RATE: 92 BPM | HEIGHT: 73 IN | WEIGHT: 210.7 LBS

## 2025-06-25 DIAGNOSIS — E55.9 VITAMIN D DEFICIENCY: ICD-10-CM

## 2025-06-25 DIAGNOSIS — Z46.81 INSULIN PUMP FITTING OR ADJUSTMENT: ICD-10-CM

## 2025-06-25 DIAGNOSIS — Z79.4 TYPE 2 DIABETES MELLITUS WITH HYPERGLYCEMIA, WITH LONG-TERM CURRENT USE OF INSULIN: Primary | ICD-10-CM

## 2025-06-25 DIAGNOSIS — E10.9 TYPE 1 DIABETES MELLITUS WITHOUT COMPLICATION: ICD-10-CM

## 2025-06-25 DIAGNOSIS — E11.65 TYPE 2 DIABETES MELLITUS WITH HYPERGLYCEMIA, WITH LONG-TERM CURRENT USE OF INSULIN: Primary | ICD-10-CM

## 2025-06-25 DIAGNOSIS — E10.3293 TYPE 1 DIABETES MELLITUS WITH MILD NONPROLIFERATIVE DIABETIC RETINOPATHY WITHOUT MACULAR EDEMA, BILATERAL: ICD-10-CM

## 2025-06-25 PROCEDURE — 3078F DIAST BP <80 MM HG: CPT | Performed by: NURSE PRACTITIONER

## 2025-06-25 PROCEDURE — 3008F BODY MASS INDEX DOCD: CPT | Performed by: NURSE PRACTITIONER

## 2025-06-25 PROCEDURE — 1036F TOBACCO NON-USER: CPT | Performed by: NURSE PRACTITIONER

## 2025-06-25 PROCEDURE — 3074F SYST BP LT 130 MM HG: CPT | Performed by: NURSE PRACTITIONER

## 2025-06-25 PROCEDURE — 95251 CONT GLUC MNTR ANALYSIS I&R: CPT | Performed by: NURSE PRACTITIONER

## 2025-06-25 PROCEDURE — 99215 OFFICE O/P EST HI 40 MIN: CPT | Performed by: NURSE PRACTITIONER

## 2025-06-25 RX ORDER — INSULIN LISPRO-AABC 100 [IU]/ML
INJECTION, SOLUTION INTRAVENOUS; SUBCUTANEOUS
Qty: 140 ML | Refills: 3 | Status: SHIPPED | OUTPATIENT
Start: 2025-06-25

## 2025-06-25 RX ORDER — BLOOD-GLUCOSE SENSOR
EACH MISCELLANEOUS
Qty: 9 EACH | Refills: 1 | Status: SHIPPED | OUTPATIENT
Start: 2025-06-25

## 2025-06-25 RX ORDER — SEMAGLUTIDE 1.34 MG/ML
1 INJECTION, SOLUTION SUBCUTANEOUS
Qty: 9 ML | Refills: 3 | Status: SHIPPED | OUTPATIENT
Start: 2025-06-25

## 2025-06-25 ASSESSMENT — ENCOUNTER SYMPTOMS
SEIZURES: 0
PALPITATIONS: 0
POLYDIPSIA: 0
DEPRESSION: 0
NAUSEA: 0
DIARRHEA: 0
SHORTNESS OF BREATH: 0
LOSS OF SENSATION IN FEET: 0
APPETITE CHANGE: 0
WEAKNESS: 0
FREQUENCY: 0
DIZZINESS: 0
NERVOUS/ANXIOUS: 0
ACTIVITY CHANGE: 0
CONSTIPATION: 0
NUMBNESS: 0
POLYPHAGIA: 0
OCCASIONAL FEELINGS OF UNSTEADINESS: 0
SLEEP DISTURBANCE: 0
FATIGUE: 0

## 2025-06-25 ASSESSMENT — PATIENT HEALTH QUESTIONNAIRE - PHQ9
SUM OF ALL RESPONSES TO PHQ9 QUESTIONS 1 AND 2: 0
2. FEELING DOWN, DEPRESSED OR HOPELESS: NOT AT ALL
1. LITTLE INTEREST OR PLEASURE IN DOING THINGS: NOT AT ALL

## 2025-06-25 ASSESSMENT — PAIN SCALES - GENERAL: PAINLEVEL_OUTOF10: 0-NO PAIN

## 2025-06-25 NOTE — PATIENT INSTRUCTIONS
Diabetes mellitus, is at goal. A1C 6.6% May 2025  RX changes:   Continue OZEMPIC 1 mg weekly: If you have increased GI issues please let us know.   Basal: 12 AM 1.3 units per hour sensitivity 1: 30  7 AM 1.1 units per hour sensitivity 1 unit per 30 mg/dl  11 AM 1.3 units per hour sensitivity 25 mg/dl  Education:  blood sugar goals, complications of diabetes mellitus, and hypoglycemia prevention and treatment  Retinopathy: He is aware he due for exam. Dr Toni Newman.   BMI 27: He continues to lose weight. Instructed to increase whole fruits/vegestables & reduce packaged/processed food   Follow up: I recommend diabetes care be with myself 6 months

## 2025-06-25 NOTE — PROGRESS NOTES
"Subjective   Talib Bell is a 29 y.o. male presents today for a follow up of type 2 diabetes.  Initial diagnosis with diabetes was age 8  Known complications include: none    Patient of [facility] and last seen by her 6 months  A1c 6,.5%.  Previous A1c 6.9% May 2024    Current diabetes regimen is as follows:   Tandem insulin pump with Dexcom G7  Ozempic 1 mg weekly    States he went to ER for gastric pain one time when he increased Ozempic to 1 mg weekly. Went to GI at  and was told MD did not think it was from Ozempic.     Patient is using continuous glucose monitor- Dexcom G7  The patient is currently checking the blood glucose as needed    Hypoglycemia frequency: 0.3%  Hypoglycemia awareness: yes     Regarding symptoms of hyperglycemia, the patient is not experiencing symptoms such as polydipsia, polyuria, nocturia, and blurry vision.     Last foot exam: Seen for club feet in the past, but is not seen currently. Denies issues at this time.   Last eye exam: June 2024 Toni Newman MD. Is aware he is due for exam.   Last urine albumin: Random 29 May 2025.   Lipid profile: LDL 81 May 2025.    Review of Systems   Constitutional:  Negative for activity change, appetite change and fatigue.   Respiratory:  Negative for shortness of breath.    Cardiovascular:  Negative for chest pain, palpitations and leg swelling.   Gastrointestinal:  Negative for constipation, diarrhea and nausea.   Endocrine: Negative for cold intolerance, heat intolerance, polydipsia, polyphagia and polyuria.   Genitourinary:  Negative for frequency.   Musculoskeletal:  Negative for gait problem.   Skin:  Negative for rash.   Neurological:  Negative for dizziness, seizures, weakness and numbness.   Psychiatric/Behavioral:  Negative for sleep disturbance and suicidal ideas. The patient is not nervous/anxious.     Objective    Blood pressure 120/73, pulse 92, temperature 36.6 °C (97.9 °F), height 1.854 m (6' 1\"), weight 95.6 kg (210 lb 11.2 " oz).  Physical Exam  Vitals and nursing note reviewed.   HENT:      Head: Atraumatic.   Pulmonary:      Effort: Pulmonary effort is normal.   Skin:     General: Skin is warm.   Neurological:      General: No focal deficit present.      Mental Status: He is alert and oriented to person, place, and time. Mental status is at baseline.      Gait: Gait normal.   Psychiatric:         Mood and Affect: Mood normal.         Behavior: Behavior normal.         Thought Content: Thought content normal.         Judgment: Judgment normal.               Lab Results   Component Value Date    BILITOT 0.5 03/18/2025    CALCIUM 9.6 05/01/2025    CO2 32 05/01/2025     05/01/2025    CREATININE 1.06 05/01/2025    GLUCOSE 117 (H) 05/01/2025    ALKPHOS 52 03/18/2025    K 4.5 05/01/2025    PROT 6.7 03/18/2025     05/01/2025    AST 12 03/18/2025    ALT 11 03/18/2025    BUN 11 05/01/2025    ANIONGAP 4 (L) 05/01/2025    ALBUMIN 4.2 03/18/2025    LIPASE 7 (L) 03/18/2025    GFRMALE >90 08/29/2022     Lab Results   Component Value Date    TRIG 63 05/01/2025    CHOL 154 05/01/2025    LDLCALC 81 05/01/2025    HDL 59 05/01/2025     Lab Results   Component Value Date    HGBA1C 6.6 (H) 05/01/2025    HGBA1C 6.5 11/14/2024    HGBA1C 6.9 (A) 05/14/2024       The ASCVD Risk score (Mitch DK, et al., 2019) failed to calculate for the following reasons:    The 2019 ASCVD risk score is only valid for ages 40 to 79      Assessment/Plan   Problem List Items Addressed This Visit       Vitamin D deficiency    Type 1 diabetes mellitus 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 A1C 6.6% May 2025  RX changes:   Continue OZEMPIC 1 mg weekly: If you have increased GI issues please let us know.   Basal: 12 AM 1.3 units per hour sensitivity 1: 30  7 AM 1.1 units per hour sensitivity 1 unit per 30 mg/dl  11 AM 1.3 units per hour sensitivity 25 mg/dl  Education:  blood sugar goals, complications of diabetes mellitus, and hypoglycemia prevention and treatment  Retinopathy: He is aware he due for exam. Dr Toni Newman.   BMI 27: He continues to lose weight. Instructed to increase whole fruits/vegestables & reduce packaged/processed food   Follow up: I recommend diabetes care be with myself 6 months

## 2025-06-26 ENCOUNTER — TELEPHONE (OUTPATIENT)
Dept: ENDOCRINOLOGY | Facility: CLINIC | Age: 30
End: 2025-06-26

## 2025-07-01 ENCOUNTER — APPOINTMENT (OUTPATIENT)
Dept: ENDOCRINOLOGY | Facility: CLINIC | Age: 30
End: 2025-07-01
Payer: COMMERCIAL

## 2025-07-03 ENCOUNTER — APPOINTMENT (OUTPATIENT)
Dept: ENDOCRINOLOGY | Facility: CLINIC | Age: 30
End: 2025-07-03
Payer: COMMERCIAL

## 2025-08-06 ENCOUNTER — APPOINTMENT (OUTPATIENT)
Dept: OTOLARYNGOLOGY | Facility: CLINIC | Age: 30
End: 2025-08-06
Payer: COMMERCIAL

## 2025-08-19 DIAGNOSIS — E10.9 TYPE 1 DIABETES MELLITUS WITHOUT COMPLICATION: ICD-10-CM

## 2025-08-19 RX ORDER — BLOOD-GLUCOSE SENSOR
EACH MISCELLANEOUS
Qty: 9 EACH | Refills: 1 | Status: SHIPPED | OUTPATIENT
Start: 2025-08-19

## 2025-09-03 ENCOUNTER — APPOINTMENT (OUTPATIENT)
Dept: OTOLARYNGOLOGY | Facility: CLINIC | Age: 30
End: 2025-09-03
Payer: COMMERCIAL

## 2025-12-11 ENCOUNTER — APPOINTMENT (OUTPATIENT)
Dept: ENDOCRINOLOGY | Facility: CLINIC | Age: 30
End: 2025-12-11
Payer: COMMERCIAL